# Patient Record
Sex: MALE | Race: WHITE | NOT HISPANIC OR LATINO | Employment: OTHER | URBAN - METROPOLITAN AREA
[De-identification: names, ages, dates, MRNs, and addresses within clinical notes are randomized per-mention and may not be internally consistent; named-entity substitution may affect disease eponyms.]

---

## 2021-04-22 ENCOUNTER — OFFICE VISIT (OUTPATIENT)
Dept: FAMILY MEDICINE CLINIC | Facility: CLINIC | Age: 41
End: 2021-04-22
Payer: COMMERCIAL

## 2021-04-22 VITALS
SYSTOLIC BLOOD PRESSURE: 128 MMHG | BODY MASS INDEX: 23.79 KG/M2 | OXYGEN SATURATION: 98 % | TEMPERATURE: 97.6 F | RESPIRATION RATE: 16 BRPM | HEART RATE: 88 BPM | HEIGHT: 70 IN | DIASTOLIC BLOOD PRESSURE: 74 MMHG | WEIGHT: 166.2 LBS

## 2021-04-22 DIAGNOSIS — Z00.00 HEALTHCARE MAINTENANCE: Primary | ICD-10-CM

## 2021-04-22 DIAGNOSIS — Z23 ENCOUNTER FOR IMMUNIZATION: ICD-10-CM

## 2021-04-22 PROCEDURE — 90471 IMMUNIZATION ADMIN: CPT

## 2021-04-22 PROCEDURE — 3725F SCREEN DEPRESSION PERFORMED: CPT | Performed by: FAMILY MEDICINE

## 2021-04-22 PROCEDURE — 3008F BODY MASS INDEX DOCD: CPT | Performed by: FAMILY MEDICINE

## 2021-04-22 PROCEDURE — 1036F TOBACCO NON-USER: CPT | Performed by: FAMILY MEDICINE

## 2021-04-22 PROCEDURE — 99385 PREV VISIT NEW AGE 18-39: CPT | Performed by: FAMILY MEDICINE

## 2021-04-22 PROCEDURE — 90715 TDAP VACCINE 7 YRS/> IM: CPT

## 2021-04-22 NOTE — PROGRESS NOTES
Assessment/Plan:    Healthcare maintenance  It was discussed about immunizations, diet, exercise and safety measures  Problem List Items Addressed This Visit        Other    Healthcare maintenance - Primary     It was discussed about immunizations, diet, exercise and safety measures  Relevant Orders    Comprehensive metabolic panel    Lipid Panel with Direct LDL reflex    Encounter for immunization    Relevant Orders    TDAP VACCINE GREATER THAN OR EQUAL TO 8YO IM (Completed)            Subjective:      Patient ID: Andrea Donnelly is a 47 y o  male  He presents today to establish care  He has PMH of Gilbert's syndrome  He reports that he is otherwise healthy, takes no medications, and has no complaints  He denies any tobacco or drug use  Drinks 1 beer monthly  No family history of early heart disease  Requests a TdaP, as he and his wife are expecting their first child in July  The following portions of the patient's history were reviewed and updated as appropriate: allergies, current medications, past family history, past medical history, past social history, past surgical history and problem list     Review of Systems   Constitutional: Negative for chills and fever  HENT: Negative for trouble swallowing  Eyes: Negative for visual disturbance  Respiratory: Negative for cough and shortness of breath  Cardiovascular: Negative for chest pain and palpitations  Gastrointestinal: Negative for abdominal pain, blood in stool and vomiting  Endocrine: Negative for cold intolerance and heat intolerance  Genitourinary: Negative for difficulty urinating and dysuria  Musculoskeletal: Negative for gait problem  Skin: Negative for rash  Neurological: Negative for dizziness, syncope and headaches  Hematological: Negative for adenopathy  Psychiatric/Behavioral: Negative for behavioral problems           Objective:      /74 (BP Location: Left arm, Patient Position: Sitting, Cuff Size: Standard)   Pulse 88   Temp 97 6 °F (36 4 °C) (Tympanic)   Resp 16   Ht 5' 10" (1 778 m)   Wt 75 4 kg (166 lb 3 2 oz)   SpO2 98%   BMI 23 85 kg/m²          Physical Exam  Vitals signs and nursing note reviewed  Constitutional:       Appearance: He is well-developed  HENT:      Head: Normocephalic and atraumatic  Eyes:      Pupils: Pupils are equal, round, and reactive to light  Neck:      Musculoskeletal: Normal range of motion and neck supple  Cardiovascular:      Rate and Rhythm: Normal rate and regular rhythm  Heart sounds: Normal heart sounds  Pulmonary:      Effort: Pulmonary effort is normal       Breath sounds: Normal breath sounds  Abdominal:      General: Bowel sounds are normal       Palpations: Abdomen is soft  Musculoskeletal: Normal range of motion  Lymphadenopathy:      Cervical: No cervical adenopathy  Skin:     General: Skin is warm  Findings: No rash  Neurological:      Mental Status: He is alert and oriented to person, place, and time  Cranial Nerves: No cranial nerve deficit

## 2021-04-23 ENCOUNTER — APPOINTMENT (OUTPATIENT)
Dept: LAB | Facility: IMAGING CENTER | Age: 41
End: 2021-04-23
Payer: COMMERCIAL

## 2021-04-23 DIAGNOSIS — Z00.00 HEALTHCARE MAINTENANCE: ICD-10-CM

## 2021-04-23 LAB
ALBUMIN SERPL BCP-MCNC: 4.1 G/DL (ref 3.5–5)
ALP SERPL-CCNC: 69 U/L (ref 46–116)
ALT SERPL W P-5'-P-CCNC: 43 U/L (ref 12–78)
ANION GAP SERPL CALCULATED.3IONS-SCNC: 5 MMOL/L (ref 4–13)
AST SERPL W P-5'-P-CCNC: 22 U/L (ref 5–45)
BILIRUB SERPL-MCNC: 1.34 MG/DL (ref 0.2–1)
BUN SERPL-MCNC: 10 MG/DL (ref 5–25)
CALCIUM SERPL-MCNC: 9.3 MG/DL (ref 8.3–10.1)
CHLORIDE SERPL-SCNC: 105 MMOL/L (ref 100–108)
CHOLEST SERPL-MCNC: 186 MG/DL (ref 50–200)
CO2 SERPL-SCNC: 29 MMOL/L (ref 21–32)
CREAT SERPL-MCNC: 0.84 MG/DL (ref 0.6–1.3)
GFR SERPL CREATININE-BSD FRML MDRD: 111 ML/MIN/1.73SQ M
GLUCOSE P FAST SERPL-MCNC: 89 MG/DL (ref 65–99)
HDLC SERPL-MCNC: 38 MG/DL
LDLC SERPL CALC-MCNC: 121 MG/DL (ref 0–100)
POTASSIUM SERPL-SCNC: 3.9 MMOL/L (ref 3.5–5.3)
PROT SERPL-MCNC: 7.4 G/DL (ref 6.4–8.2)
SODIUM SERPL-SCNC: 139 MMOL/L (ref 136–145)
TRIGL SERPL-MCNC: 136 MG/DL

## 2021-04-23 PROCEDURE — 80053 COMPREHEN METABOLIC PANEL: CPT

## 2021-04-23 PROCEDURE — 80061 LIPID PANEL: CPT

## 2021-04-23 PROCEDURE — 36415 COLL VENOUS BLD VENIPUNCTURE: CPT

## 2021-05-06 ENCOUNTER — TELEPHONE (OUTPATIENT)
Dept: FAMILY MEDICINE CLINIC | Facility: CLINIC | Age: 41
End: 2021-05-06

## 2021-05-06 NOTE — TELEPHONE ENCOUNTER
----- Message from [employer] sent at 5/6/2021 10:15 AM EDT -----  - Labs show slightly elevated cholesterol  I recommend low fat diet and regular exercise  - The rest of his labs are stable

## 2021-05-06 NOTE — TELEPHONE ENCOUNTER
Evangelista Orozco returned [employer], I gave him his bw results & message per Christopher leach   Pt understood & had no further questions

## 2022-04-29 ENCOUNTER — OFFICE VISIT (OUTPATIENT)
Dept: FAMILY MEDICINE CLINIC | Facility: CLINIC | Age: 42
End: 2022-04-29
Payer: COMMERCIAL

## 2022-04-29 VITALS
HEIGHT: 70 IN | OXYGEN SATURATION: 99 % | WEIGHT: 161.4 LBS | RESPIRATION RATE: 16 BRPM | HEART RATE: 76 BPM | SYSTOLIC BLOOD PRESSURE: 128 MMHG | TEMPERATURE: 97.6 F | BODY MASS INDEX: 23.11 KG/M2 | DIASTOLIC BLOOD PRESSURE: 78 MMHG

## 2022-04-29 DIAGNOSIS — E78.49 OTHER HYPERLIPIDEMIA: ICD-10-CM

## 2022-04-29 DIAGNOSIS — Z00.00 HEALTHCARE MAINTENANCE: Primary | ICD-10-CM

## 2022-04-29 DIAGNOSIS — G89.29 CHRONIC LEFT SHOULDER PAIN: ICD-10-CM

## 2022-04-29 DIAGNOSIS — M25.512 CHRONIC LEFT SHOULDER PAIN: ICD-10-CM

## 2022-04-29 PROCEDURE — 99395 PREV VISIT EST AGE 18-39: CPT | Performed by: FAMILY MEDICINE

## 2022-04-29 RX ORDER — DICLOFENAC SODIUM 75 MG/1
75 TABLET, DELAYED RELEASE ORAL 2 TIMES DAILY
Qty: 60 TABLET | Refills: 1 | Status: SHIPPED | OUTPATIENT
Start: 2022-04-29

## 2022-04-29 NOTE — PROGRESS NOTES
Assessment/Plan:  Healthcare maintenance  It was discussed about immunizations, diet, exercise and safety measures  Other hyperlipidemia  Not well controlled  Discussed about low-fat diet  I am going to repeat his fasting lipid profile  Chronic left shoulder pain  He was given prescription for diclofenac  Was told to avoid movement that triggered the pain  Apply ice  If symptoms are not improving call back and I will consider referral to see Ortho  Diagnoses and all orders for this visit:    Healthcare maintenance  -     CBC and differential; Future  -     Comprehensive metabolic panel; Future  -     TSH, 3rd generation with Free T4 reflex; Future  -     Lipid Panel with Direct LDL reflex; Future    Chronic left shoulder pain  -     diclofenac (VOLTAREN) 75 mg EC tablet; Take 1 tablet (75 mg total) by mouth 2 (two) times a day    Other hyperlipidemia  -     CBC and differential; Future  -     Comprehensive metabolic panel; Future  -     TSH, 3rd generation with Free T4 reflex; Future  -     Lipid Panel with Direct LDL reflex; Future        There are no Patient Instructions on file for this visit  Return in about 1 year (around 4/29/2023)  Subjective:      Patient ID: Deo Nichole is a 46 y o  male  Chief Complaint   Patient presents with    Physical Exam       Is here today for wellness exam and complained of left shoulder pain  Denies any history of injury or trauma  Stated it has been getting worse while he working out  The following portions of the patient's history were reviewed and updated as appropriate: allergies, current medications, past family history, past medical history, past social history, past surgical history and problem list     Review of Systems   Constitutional: Negative for chills and fever  HENT: Negative for trouble swallowing  Eyes: Negative for visual disturbance  Respiratory: Negative for cough and shortness of breath      Cardiovascular: Negative for chest pain and palpitations  Gastrointestinal: Negative for abdominal pain, blood in stool and vomiting  Endocrine: Negative for cold intolerance and heat intolerance  Genitourinary: Negative for difficulty urinating and dysuria  Musculoskeletal: Negative for gait problem  Left shoulder pain   Skin: Negative for rash  Neurological: Negative for dizziness, syncope and headaches  Hematological: Negative for adenopathy  Psychiatric/Behavioral: Negative for behavioral problems  Current Outpatient Medications   Medication Sig Dispense Refill    diclofenac (VOLTAREN) 75 mg EC tablet Take 1 tablet (75 mg total) by mouth 2 (two) times a day 60 tablet 1     No current facility-administered medications for this visit  Objective:    /78 (BP Location: Left arm, Patient Position: Sitting, Cuff Size: Standard)   Pulse 76   Temp 97 6 °F (36 4 °C) (Tympanic)   Resp 16   Ht 5' 10" (1 778 m)   Wt 73 2 kg (161 lb 6 4 oz)   SpO2 99%   BMI 23 16 kg/m²        Physical Exam  Vitals and nursing note reviewed  Constitutional:       Appearance: He is well-developed  HENT:      Head: Normocephalic and atraumatic  Eyes:      Pupils: Pupils are equal, round, and reactive to light  Cardiovascular:      Rate and Rhythm: Normal rate and regular rhythm  Heart sounds: Normal heart sounds  Pulmonary:      Effort: Pulmonary effort is normal       Breath sounds: Normal breath sounds  Abdominal:      General: Bowel sounds are normal       Palpations: Abdomen is soft  Musculoskeletal:         General: Tenderness (Tenderness to palpation the anterior aspect of left shoulder) present  Normal range of motion  Cervical back: Normal range of motion and neck supple  Lymphadenopathy:      Cervical: No cervical adenopathy  Skin:     General: Skin is warm  Findings: No rash  Neurological:      Mental Status: He is alert and oriented to person, place, and time        Cranial Nerves: No cranial nerve deficit                  Rachael Salazar MD

## 2022-04-29 NOTE — ASSESSMENT & PLAN NOTE
He was given prescription for diclofenac  Was told to avoid movement that triggered the pain  Apply ice  If symptoms are not improving call back and I will consider referral to see Ortho

## 2022-10-12 PROBLEM — Z00.00 HEALTHCARE MAINTENANCE: Status: RESOLVED | Noted: 2021-04-22 | Resolved: 2022-10-12

## 2022-11-04 ENCOUNTER — APPOINTMENT (OUTPATIENT)
Dept: RADIOLOGY | Facility: OTHER | Age: 42
End: 2022-11-04

## 2022-11-04 VITALS
HEART RATE: 74 BPM | DIASTOLIC BLOOD PRESSURE: 79 MMHG | SYSTOLIC BLOOD PRESSURE: 120 MMHG | HEIGHT: 70 IN | WEIGHT: 165 LBS | BODY MASS INDEX: 23.62 KG/M2

## 2022-11-04 DIAGNOSIS — G89.29 CHRONIC LEFT SHOULDER PAIN: ICD-10-CM

## 2022-11-04 DIAGNOSIS — M25.511 ACUTE PAIN OF RIGHT SHOULDER: ICD-10-CM

## 2022-11-04 DIAGNOSIS — M75.42 IMPINGEMENT SYNDROME OF LEFT SHOULDER: ICD-10-CM

## 2022-11-04 DIAGNOSIS — G89.29 CHRONIC RIGHT SHOULDER PAIN: ICD-10-CM

## 2022-11-04 DIAGNOSIS — M25.512 CHRONIC LEFT SHOULDER PAIN: ICD-10-CM

## 2022-11-04 DIAGNOSIS — G89.29 CHRONIC LEFT SHOULDER PAIN: Primary | ICD-10-CM

## 2022-11-04 DIAGNOSIS — M75.41 IMPINGEMENT SYNDROME OF RIGHT SHOULDER: ICD-10-CM

## 2022-11-04 DIAGNOSIS — M25.512 CHRONIC LEFT SHOULDER PAIN: Primary | ICD-10-CM

## 2022-11-04 DIAGNOSIS — M25.511 CHRONIC RIGHT SHOULDER PAIN: ICD-10-CM

## 2022-11-04 RX ORDER — TRIAMCINOLONE ACETONIDE 40 MG/ML
40 INJECTION, SUSPENSION INTRA-ARTICULAR; INTRAMUSCULAR
Status: COMPLETED | OUTPATIENT
Start: 2022-11-04 | End: 2022-11-04

## 2022-11-04 RX ADMIN — TRIAMCINOLONE ACETONIDE 40 MG: 40 INJECTION, SUSPENSION INTRA-ARTICULAR; INTRAMUSCULAR at 17:44

## 2022-11-04 NOTE — PATIENT INSTRUCTIONS
Bilateral shoulder impingement  Left shoulder steroid injection today  Start formal PT and home exercise program for both shoulders  Reduce workouts to every other day  Follow up with our office as needed

## 2022-11-04 NOTE — PROGRESS NOTES
1  Chronic left shoulder pain  XR shoulder 2+ vw left    Large joint arthrocentesis: R subacromial bursa    Ambulatory referral to Physical Therapy   2  Chronic right shoulder pain  Ambulatory referral to Physical Therapy   3  Impingement syndrome of left shoulder  Large joint arthrocentesis: R subacromial bursa    Ambulatory referral to Physical Therapy   4  Impingement syndrome of right shoulder  Ambulatory referral to Physical Therapy     Orders Placed This Encounter   Procedures   • Large joint arthrocentesis: R subacromial bursa   • XR shoulder 2+ vw left   • Ambulatory referral to Physical Therapy        Imaging Studies (I personally reviewed images in PACS and report):  Left shoulder x-ray 11/04/2022: No acute osseous abnormality  Right shoulder x-ray 11/04/2022: No acute osseous abnormality    IMPRESSION:  Bilateral shoulder impingement, left worse than right      Repeat X-ray next visit: None    Return if symptoms worsen or fail to improve  Patient Instructions   Bilateral shoulder impingement  Left shoulder steroid injection today  Start formal PT and home exercise program for both shoulders  Reduce workouts to every other day  Follow up with our office as needed        CHIEF COMPLAINT:  Bilateral shoulder pain    HPI:  Hernandez Gamble is a 38 y o  male  who presents as a new patient for evaluation of chronic left shoulder pain  Per chart review, this was first mentioned to his PCP Dr Claudia Katz in April of this year, for which she was referred to our office  Patient is right-hand dominant  Visit 11/4/2022 : Today, patient reports bilateral shoulder pain, left worse than right  For his left shoulder, he has had discomfort for over 2 years  Describes discomfort as dull and intermittent as well as of mild-to-moderate severity  Pain is not constant but present when reaching out/for or up above his head  Localized to his greater tuberosity  States his pain interferes with his total range of motion  Denies injury or trauma history but states he was trying to do more at home workouts around the onset of pain in 2020  He had some palliation of his discomfort with stretching and a course of oral Voltaren  Denies weakness, paresthesias, or neck pain  Has not tried formal physical therapy in the past   States he continues to try to work out with his upper extremities roughly 5 times per week  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  Regarding his right shoulder discomfort, he states this started a few weeks ago after a day of heavy lifting involving caring drywall down into his basement  He qualifies this discomfort as a mild ache noted particularly when yawning  States he has full range of motion with his right shoulder  Localizes his discomfort to the upper lateral arm and the superior aspect of his shoulder  He has been using heating pad with good effect  Also denies injury or trauma history to the shoulder  Review of Systems      Following history reviewed and update:    Past Medical History:   Diagnosis Date   • Gilbert's syndrome      History reviewed  No pertinent surgical history    Social History   Social History     Substance and Sexual Activity   Alcohol Use Yes     Social History     Substance and Sexual Activity   Drug Use Not on file     Social History     Tobacco Use   Smoking Status Never Smoker   Smokeless Tobacco Never Used     Family History   Problem Relation Age of Onset   • Colon cancer Father    • Diabetes Maternal Grandmother    • Arthritis Maternal Grandfather    • Diabetes Paternal Grandfather    • Dementia Paternal Grandfather      No Known Allergies       Physical Exam  /79 (BP Location: Right arm, Patient Position: Sitting, Cuff Size: Adult)   Pulse 74   Ht 5' 10" (1 778 m)   Wt 74 8 kg (165 lb)   BMI 23 68 kg/m²     Constitutional:  see vital signs  Gen: well-developed, normocephalic/atraumatic, well-groomed  Eyes: No inflammation or discharge of conjunctiva or lids; sclera clear   Pharynx: no inflammation, lesion, or mass of lips  Neck: supple, no masses, non-distended  MSK: no inflammation, lesion, mass, or clubbing of nails and digits except for other than mentioned below  SKIN: no visible rashes or skin lesions  Pulmonary/Chest: Effort normal  No respiratory distress     NEURO: cranial nerves grossly intact  PSYCH:  Alert and oriented to person, place, and time; recent and remote memory intact; mood normal, no depression, anxiety, or agitation, judgment and insight good and intact     Ortho Exam  LEFT SHOULDER:  Erythema: no  Swelling: no  Increased Warmth: no    Tenderness: + anterolateral subacromial    ROM  Touchdown sign: abnormal, abducts to 160 degrees with pain  External Rotation: 60  Internal Rotation: L1    Strength  Abduction: 5/5  ER: 5/5  IR: 5/5    Drop-Arm: negative  Emptycan: negative  Belly Press: -  Lift-off Test:    Bill: +  Neer: +  Cross-Arm: -  Speeds: -    Internal Impingement:  (crank position pain)    Labral Crank Test :  (abducted 90, axial load, guided IR & Er)    Modified Labral Shift:  (seated, ER, abduction, axial load, guided abd/add)    Winchester's Test: -  (FF 90, abd 15, resist thumbs up-, resist thumbs down+)    Apprehension: -  Ashish's Relocation Maneuver:    RIGHT SHOULDER:  Erythema: no  Swelling: no  Increased Warmth: no    Tenderness:     ROM  Touchdown sign: intact  External Rotation: Intact  Internal Rotation: Intact    Strength  Abduction: 5/5  ER: 5/5  IR: 5/5    Drop-Arm: negative  Emptycan: negative  Belly Press:   Lift-off Test:    Khan: negative  Neer: negative  Cross-Arm:   Speeds: -    Internal Impingement:  (crank position pain)    Labral Crank Test :  (abducted 90, axial load, guided IR & Er)    Modified Labral Shift:  (seated, ER, abduction, axial load, guided abd/add)    Winchester's Test: -  (FF 90, abd 15, resist thumbs up-, resist thumbs down+)    Apprehension:-  Ashish's Relocation Maneuver:      Large joint arthrocentesis: L subacromial bursa  Universal Protocol:  Consent: Verbal consent obtained  Risks and benefits: risks, benefits and alternatives were discussed  Consent given by: patient  Patient understanding: patient states understanding of the procedure being performed  Site marked: the operative site was marked  Radiology Images displayed and confirmed   If images not available, report reviewed: imaging studies available  Required items: required blood products, implants, devices, and special equipment available  Patient identity confirmed: verbally with patient    Supporting Documentation  Indications: pain   Procedure Details  Location: shoulder - L subacromial bursa  Preparation: Patient was prepped and draped in the usual sterile fashion  Needle size: 22 G  Ultrasound guidance: no  Approach: posterolateral  Medications administered: 40 mg triamcinolone acetonide 40 mg/mL    Patient tolerance: patient tolerated the procedure well with no immediate complications  Dressing:  Sterile dressing applied    Naropin (Ropivacaine) 0 5%  NDC: 88113-448-70  Lot#: [id number]  Exp: 01/2026  4mL

## 2022-11-08 ENCOUNTER — EVALUATION (OUTPATIENT)
Dept: PHYSICAL THERAPY | Facility: REHABILITATION | Age: 42
End: 2022-11-08

## 2022-11-08 DIAGNOSIS — M75.41 IMPINGEMENT SYNDROME OF RIGHT SHOULDER: ICD-10-CM

## 2022-11-08 DIAGNOSIS — G89.29 CHRONIC LEFT SHOULDER PAIN: ICD-10-CM

## 2022-11-08 DIAGNOSIS — G89.29 CHRONIC RIGHT SHOULDER PAIN: ICD-10-CM

## 2022-11-08 DIAGNOSIS — M75.42 IMPINGEMENT SYNDROME OF LEFT SHOULDER: ICD-10-CM

## 2022-11-08 DIAGNOSIS — M25.511 CHRONIC RIGHT SHOULDER PAIN: ICD-10-CM

## 2022-11-08 DIAGNOSIS — M25.512 CHRONIC LEFT SHOULDER PAIN: ICD-10-CM

## 2022-11-08 NOTE — PROGRESS NOTES
PT Evaluation     Today's date: 2022  Patient name: Juan Piña  : 1980  MRN: [id number]  Referring provider: Sinai Hunter DO  Dx:   Encounter Diagnosis     ICD-10-CM    1  Chronic left shoulder pain  M25 512 Ambulatory referral to Physical Therapy    G89 29    2  Chronic right shoulder pain  M25 511 Ambulatory referral to Physical Therapy    G89 29    3  Impingement syndrome of left shoulder  M75 42 Ambulatory referral to Physical Therapy   4  Impingement syndrome of right shoulder  M75 41 Ambulatory referral to Physical Therapy       Start Time: 1115  Stop Time: 1200  Total time in clinic (min): 45 minutes    Assessment  Assessment details: Pt is a 37 yo male with chronic left shoulder pain that began 2 years ago and and right shoulder pain that began after hanging drywall a couple of months ago  His left shoulder pain has significant reduced since injection but he continues to have end range pain and IR is limited greater than ER, flex and abd  His right shoulder is grossly WNL but there is a painful arc  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  Cervical ROM is painful on the right and limited with pain radiating into the right UT  He would benefit from therapeutic exercise to improve posture, ROM and function and manual therapy to improve capsular mobility and cervical mobility      Impairments: abnormal or restricted ROM, impaired physical strength, lacks appropriate home exercise program, pain with function and poor posture     Symptom irritability: moderateUnderstanding of Dx/Px/POC: excellent  Goals  STG: in 4 week  ROM WNL  Performing HEP consistently  LTG: by discharge  Able to reach out to the side without pain  Able to reach back without pain  Able to do heavy household work without increased pain    Plan  Patient would benefit from: skilled physical therapy  Referral necessary: No  Planned modality interventions: thermotherapy: hydrocollator packs  Planned therapy interventions: joint mobilization, manual therapy, neuromuscular re-education, patient education, strengthening, stretching, therapeutic exercise and home exercise program  Frequency: 2x week  Duration in weeks: 10  Treatment plan discussed with: patient        Subjective Evaluation    History of Present Illness  Mechanism of injury: Today, patient reports bilateral shoulder pain, left worse than right  For his left shoulder, he has had discomfort for over 2 years  Describes discomfort as dull and intermittent as well as of mild-to-moderate severity  Pain is not constant but present when reaching out/for or up above his head  Localized to his greater tuberosity  States his pain interferes with his total range of motion  Denies injury or trauma history but states he was trying to do more at home workouts around the onset of pain in   He received a steroid injection on the left on  and it is feeling much better since then  The right one started after carrying drywall  2-3 weeks ago and it is worsening  Yawning increased this pain  Still difficult to raise the left arm in abduction and flexion  Pain  Current pain ratin  At best pain ratin  At worst pain ratin  Location: Right posterior lateral pain, Left lateral pain  Quality: sharp    Life stress: desk work,  exercising, yard work, care for todler    Patient Goals  Patient goals for therapy: decreased pain  Patient goal: Carry toddler,  weight lifting        Objective     Postural Observations  Seated posture: fair  Standing posture: fair        Palpation   Left   No palpable tenderness to the biceps, infraspinatus, levator scapulae, middle trapezius, rhomboids, subscapularis and teres major  Tenderness of the supraspinatus  Right   No palpable tenderness to the infraspinatus, levator scapulae, middle trapezius, rhomboids and subscapularis  Muscle spasm in the upper trapezius     Tenderness of the biceps, supraspinatus and teres major  Tenderness     Left Shoulder   Tenderness in the [facility] joint  Right Shoulder  Tenderness in the bicipital groove, infraspinatus tendon, subacromial bursa and supraspinatus tendon  Cervical/Thoracic Screen   Cervical range of motion within normal limits with the following exceptions: Limited 25% in SB w/right sided pain in both directions  Right sided pain with right rotation and extension    Active Range of Motion   Left Shoulder   Flexion: 140 degrees with pain  Abduction: 140 degrees with pain  External rotation BTH: T2 with pain  Internal rotation BTB: L5 with pain    Right Shoulder   Flexion: 165 degrees with pain  Abduction: 165 degrees with pain  External rotation BTH: T3   Internal rotation BTB: T5     Passive Range of Motion   Left Shoulder   Flexion: 160 degrees with pain  Abduction: 165 degrees with pain  External rotation 90°: 45 degrees with pain  Internal rotation 90°: 40 degrees with pain    Right Shoulder   Flexion: WFL and with pain  Abduction: WFL and with pain  External rotation 90°: WFL and with pain  Internal rotation 90°: [facility]    Joint Play   Left Shoulder  Joints within functional limits are the anterior capsule and inferior capsule  Hypomobile in the posterior capsule and AC joint  Right Shoulder  Joints within functional limits are the anterior capsule, posterior capsule and AC joint  Hypomobile in the inferior capsule  Comments  Left posterior capsule comments: painful  Right posterior capsule comments: painful       Additional Joint Play Details  Hypomobile at CT junction, and T2,3      Strength/Myotome Testing     Left Shoulder     Planes of Motion   Flexion: 4   Abduction: 5   External rotation at 0°: 5   Internal rotation at 0°: 5     Isolated Muscles   Biceps: 5   Triceps: 5     Right Shoulder     Planes of Motion   Flexion: 5   Abduction: 5   External rotation at 0°: 5   Internal rotation at 0°: 5     Isolated Muscles   Biceps: 5   Triceps: 5     Tests     Left Shoulder   Positive AC shear, Hawkin's and Neer's  Right Shoulder   Negative Hawkin's and Neer's  Flowsheet Rows    Flowsheet Row Most Recent Value   PT/OT G-Codes    Current Score 62   Projected Score 75             Precautions: none     Daily Treatment Diary      Assessment  11/8                     Eval/Reval                       FOTO         **         **   Manuals    GH mob NT                                               Exercise Diary                             cerv retr x10                      cerv retr and ext x10                      scap retr x10                      supine shoulder wand flex x10                      towel IR stretch 10"x5                      pec stretch                        TB B ER                        TB rows                                                                                                                                                                                               Modalities    ice PRN                                             Access Code: [email or web address]  URL: [email or web address]  Date: 11/08/2022  Prepared by: Lauren Caro    Exercises  · Seated Cervical Retraction - 5 x daily - 7 x weekly - 1 sets - 10 reps - 2 sec hold  · Seated Cervical Retraction and Extension - 5 x daily - 7 x weekly - 1 sets - 10 reps  · Standing Scapular Retraction - 2 x daily - 7 x weekly - 1 sets - 10 reps - 5 sec hold  · Supine Shoulder Flexion with Dowel - 2 x daily - 7 x weekly - 1 sets - 10 reps  · Standing Shoulder Internal Rotation Stretch with Towel - 2 x daily - 7 x weekly - 1 sets - 10 reps - 5 sec hold

## 2022-11-15 ENCOUNTER — OFFICE VISIT (OUTPATIENT)
Dept: PHYSICAL THERAPY | Facility: REHABILITATION | Age: 42
End: 2022-11-15

## 2022-11-15 DIAGNOSIS — G89.29 CHRONIC LEFT SHOULDER PAIN: Primary | ICD-10-CM

## 2022-11-15 DIAGNOSIS — M75.41 IMPINGEMENT SYNDROME OF RIGHT SHOULDER: ICD-10-CM

## 2022-11-15 DIAGNOSIS — M75.42 IMPINGEMENT SYNDROME OF LEFT SHOULDER: ICD-10-CM

## 2022-11-15 DIAGNOSIS — M25.511 CHRONIC RIGHT SHOULDER PAIN: ICD-10-CM

## 2022-11-15 DIAGNOSIS — G89.29 CHRONIC RIGHT SHOULDER PAIN: ICD-10-CM

## 2022-11-15 DIAGNOSIS — M25.512 CHRONIC LEFT SHOULDER PAIN: Primary | ICD-10-CM

## 2022-11-15 NOTE — PROGRESS NOTES
Daily Note     Today's date: 11/15/2022  Patient name: Doreen Johnson  : 1980  MRN: [id number]  Referring provider: Sayda Calix DO  Dx:   Encounter Diagnosis     ICD-10-CM    1  Chronic left shoulder pain  M25 512     G89 29    2  Chronic right shoulder pain  M25 511     G89 29    3  Impingement syndrome of left shoulder  M75 42    4  Impingement syndrome of right shoulder  M75 41                   Subjective: Pt is feeling much better compared to last session  He states the interventions are going well and believes they are helping and noticed a great improvement in his LUE shoulder elevation ROM  Objective: See treatment diary below      Assessment: Pt showed good tolerance to all interventions today except noting increased difficulty with pec stretches especially on the LUE  He demonstrated full but painful end range motion for flexion  He was educated to continue with current HEP and will progress as tolerated at next visit  Plan: Continue per plan of care        Precautions: none     Daily Treatment Diary      Assessment  11/8  11/15                   Eval/Reval                       FOTO         **         **   Manuals     mob NT  [address]                                             Exercise Diary                             cerv retr x10  10x                     cerv retr and ext x10  10x                     scap retr x10  10x5"                     supine shoulder wand flex x10                      towel IR stretch 10"x5                      pec stretch    20"x3                     TB B ER    BTB 10x5"                    TB rows/ext    10x3 BTB                    UBE   BW 6'                                                                                                                                                                    Modalities    ice PRN

## 2022-11-17 ENCOUNTER — OFFICE VISIT (OUTPATIENT)
Dept: PHYSICAL THERAPY | Facility: REHABILITATION | Age: 42
End: 2022-11-17

## 2022-11-17 DIAGNOSIS — M25.512 CHRONIC LEFT SHOULDER PAIN: Primary | ICD-10-CM

## 2022-11-17 DIAGNOSIS — M25.511 CHRONIC RIGHT SHOULDER PAIN: ICD-10-CM

## 2022-11-17 DIAGNOSIS — M75.42 IMPINGEMENT SYNDROME OF LEFT SHOULDER: ICD-10-CM

## 2022-11-17 DIAGNOSIS — G89.29 CHRONIC LEFT SHOULDER PAIN: Primary | ICD-10-CM

## 2022-11-17 DIAGNOSIS — G89.29 CHRONIC RIGHT SHOULDER PAIN: ICD-10-CM

## 2022-11-17 DIAGNOSIS — M75.41 IMPINGEMENT SYNDROME OF RIGHT SHOULDER: ICD-10-CM

## 2022-11-17 NOTE — PROGRESS NOTES
Daily Note     Today's date: 2022  Patient name: Carmen Clancy  : 1980  MRN: [id number]  Referring provider: Jody Shearer DO  Dx:   Encounter Diagnosis     ICD-10-CM    1  Chronic left shoulder pain  M25 512     G89 29       2  Chronic right shoulder pain  M25 511     G89 29       3  Impingement syndrome of left shoulder  M75 42       4  Impingement syndrome of right shoulder  M75 41                      Subjective: Patient reports seeing improvements in shoulder discomfort since starting therapy  Objective: See treatment diary below      Assessment: Patient continues to tolerate treatment well  Good form and understanding of exercises performed  Updated HEP provided to patient today  Left shoulder PROM improving but still limited, codey IR  No increased pain reported post treatment  Pt will benefit from continued skilled PT intervention in order to address his remaining limitations and to restore maximal function  Plan: Continue per plan of care        Precautions: none     Daily Treatment Diary      Assessment  11/8  11/15  11/17                 Eval/Reval                       FOTO         **         **   Manuals     mob NT  [address]  Glides JG                    Gentle L sh PROM JG                       Exercise Diary                             cerv retr x10  10x   5"x15                  cerv retr and ext x10  10x   10x                  scap retr x10  10x5"   5"x20                  supine shoulder wand flex x10    x10                  towel IR stretch 10"x5    10"x5                  pec stretch    20"x3   15"x5                  TB B ER    BTB 10x5"  BTB 5"x15                  TB rows/ext    10x3 BTB  Black 2x10                  UBE   BW 6'   retro 6' L2                                                IR with wand x10                                                                                                                 Modalities    ice PRN

## 2022-11-22 ENCOUNTER — OFFICE VISIT (OUTPATIENT)
Dept: PHYSICAL THERAPY | Facility: REHABILITATION | Age: 42
End: 2022-11-22

## 2022-11-22 DIAGNOSIS — G89.29 CHRONIC LEFT SHOULDER PAIN: Primary | ICD-10-CM

## 2022-11-22 DIAGNOSIS — M25.512 CHRONIC LEFT SHOULDER PAIN: Primary | ICD-10-CM

## 2022-11-22 DIAGNOSIS — M25.511 CHRONIC RIGHT SHOULDER PAIN: ICD-10-CM

## 2022-11-22 DIAGNOSIS — M75.42 IMPINGEMENT SYNDROME OF LEFT SHOULDER: ICD-10-CM

## 2022-11-22 DIAGNOSIS — G89.29 CHRONIC RIGHT SHOULDER PAIN: ICD-10-CM

## 2022-11-22 DIAGNOSIS — M75.41 IMPINGEMENT SYNDROME OF RIGHT SHOULDER: ICD-10-CM

## 2022-11-22 NOTE — PROGRESS NOTES
Daily Note     Today's date: 2022  Patient name: Nancy Forrest  : 1980  MRN: [id number]  Referring provider: Farooq Christian DO  Dx:   Encounter Diagnosis     ICD-10-CM    1  Chronic left shoulder pain  M25 512     G89 29       2  Chronic right shoulder pain  M25 511     G89 29       3  Impingement syndrome of left shoulder  M75 42       4  Impingement syndrome of right shoulder  M75 41                      Subjective: pt reports no longer experiences pain at rest and less pain/difficulty with reaching into cabinets  Objective: See treatment diary below      Assessment: Tolerated treatment well  Increased mobility/ROM post manuals with less pain/discomfort at end range  Good response with exercises with no adverse reactions  Patient demonstrated fatigue post treatment, exhibited good technique with therapeutic exercises and would benefit from continued PT      Plan: Continue per plan of care  Progress treatment as tolerated         Precautions: none     Daily Treatment Diary      Assessment  11/8  11/15  11/17  11/22               Eval/Reval                       FOTO         **         **   Manuals     mob NT  [address]  [place] JG  TE brigid post/inf & AC L shldr                  Gentle L sh PROM JG L shldr  TE                      Exercise Diary                             cerv retr x10  10x   5"x15  5"x15                cerv retr and ext x10  10x   10x  x10                scap retr x10  10x5"   5"x20  5"x20                supine shoulder wand flex x10    x10  5"x10                towel IR stretch 10"x5    10"x5                  pec stretch    20"x3   15"x5  30"x3                TB B ER    BTB 10x5"  BTB 5"x15  BTB 5"x20                TB rows/ext    10x3 BTB  Black 2x10  black 5"x20                UBE   BW 6'   retro 6' L2  retro 6' L2                                              IR with wand x10 Modalities    ice PRN

## 2022-11-28 ENCOUNTER — OFFICE VISIT (OUTPATIENT)
Dept: PHYSICAL THERAPY | Facility: REHABILITATION | Age: 42
End: 2022-11-28

## 2022-11-28 DIAGNOSIS — G89.29 CHRONIC RIGHT SHOULDER PAIN: ICD-10-CM

## 2022-11-28 DIAGNOSIS — M25.512 CHRONIC LEFT SHOULDER PAIN: Primary | ICD-10-CM

## 2022-11-28 DIAGNOSIS — M75.42 IMPINGEMENT SYNDROME OF LEFT SHOULDER: ICD-10-CM

## 2022-11-28 DIAGNOSIS — M75.41 IMPINGEMENT SYNDROME OF RIGHT SHOULDER: ICD-10-CM

## 2022-11-28 DIAGNOSIS — G89.29 CHRONIC LEFT SHOULDER PAIN: Primary | ICD-10-CM

## 2022-11-28 DIAGNOSIS — M25.511 CHRONIC RIGHT SHOULDER PAIN: ICD-10-CM

## 2022-11-28 NOTE — PROGRESS NOTES
Daily Note     Today's date: 2022   Patient name: Rafiq Alonzo  : 1980  MRN: [id number]  Referring provider: Morales Shelby DO  Dx:   Encounter Diagnosis     ICD-10-CM    1  Chronic left shoulder pain  M25 512     G89 29       2  Chronic right shoulder pain  M25 511     G89 29       3  Impingement syndrome of left shoulder  M75 42       4  Impingement syndrome of right shoulder  M75 41                      Subjective: Pt states that his left shoulder continues to feel good but the right is still painful with yawning and reaching up and back  Objective: See treatment diary below      Assessment: Tolerated treatment well  Pt has end range pain with left shoulder abd and flexion and a painful arc in abduction  This reduced with manual therapy  It did not full resolve with MWM but did after [facility] mobilization  Notable UT activation with shoulder activity  This reduced with motion performed in side lying  Pt will need reeducation to restore normal biomechanics of the shoulder    Patient demonstrated fatigue post treatment, exhibited good technique with therapeutic exercises and would benefit from continued PT      Plan: Continue per plan of care  Progress treatment as tolerated         Precautions: none     Daily Treatment Diary      Assessment  11/8  11/15  11/17  11/22  11/28             Eval/Reval                       FOTO         **  do Foto       **   Manuals    GH mob NT  [address]  Glides JG  TE glides post/inf & AC L shldr  NT gr IV inf and post                Gentle L sh PROM JG L shldr  TE NT        recip stab     60"x2        MWM GH w/scaption     NT        Exercise Diary                             cerv retr x10  10x   5"x15  5"x15  5"x10              cerv retr and ext x10  10x   10x  x10  x10              scap retr x10  10x5"   5"x20  5"x20                supine shoulder wand flex x10    x10  5"x10  5"x10              towel IR stretch 10"x5    10"x5    20"x3              pec stretch    20"x3 15"x5  30"x3  30"x3              TB B ER    BTB 10x5"  BTB 5"x15  BTB 5"x20 Blue loop 5" 2x10              TB rows/ext    10x3 BTB  Black 2x10  black 5"x20  20# rows 2x10, 15# 2x10              UBE   BW 6'   retro 6' L2  retro 6' L2  3'fwd, 3'back                                            IR with wand x10                  SL flex & abd         2x10              supine alphabet         1x                                                             Modalities    ice PRN

## 2022-11-30 ENCOUNTER — OFFICE VISIT (OUTPATIENT)
Dept: PHYSICAL THERAPY | Facility: REHABILITATION | Age: 42
End: 2022-11-30

## 2022-11-30 DIAGNOSIS — G89.29 CHRONIC RIGHT SHOULDER PAIN: ICD-10-CM

## 2022-11-30 DIAGNOSIS — M75.41 IMPINGEMENT SYNDROME OF RIGHT SHOULDER: ICD-10-CM

## 2022-11-30 DIAGNOSIS — M75.42 IMPINGEMENT SYNDROME OF LEFT SHOULDER: ICD-10-CM

## 2022-11-30 DIAGNOSIS — M25.512 CHRONIC LEFT SHOULDER PAIN: Primary | ICD-10-CM

## 2022-11-30 DIAGNOSIS — G89.29 CHRONIC LEFT SHOULDER PAIN: Primary | ICD-10-CM

## 2022-11-30 DIAGNOSIS — M25.511 CHRONIC RIGHT SHOULDER PAIN: ICD-10-CM

## 2022-11-30 NOTE — PROGRESS NOTES
Daily Note     Today's date: 2022   Patient name: Deo Nicohle  : 1980  MRN: [id number]  Referring provider: Colton Reyez DO  Dx:   Encounter Diagnosis     ICD-10-CM    1  Chronic left shoulder pain  M25 512     G89 29       2  Chronic right shoulder pain  M25 511     G89 29       3  Impingement syndrome of left shoulder  M75 42       4  Impingement syndrome of right shoulder  M75 41                      Subjective: Pt reports he feels gradual improvement in both shoulders, however, continues with left shoulder pain with reaching up/behind with his left hand  Objective: See treatment diary below  Assessment: Pt with good response to manual techniques added today, noting improved mobility and decreased pain with completion  He required moderate verbal cues for proper form and position with all exercises added today  Will continue to progress program as able  Pt will benefit from continued skilled PT intervention in order to address his remaining limitations and to restore maximal function  Plan: Continue per plan of care  Progress treatment as tolerated         Precautions: none     Daily Treatment Diary      Assessment  11/8  11/15  11/17  11/22  11/28  11/30           Eval/Reval                       FOTO         **  do Foto       **   Manuals    GH mob NT  [address]  Glides JG  TE glides post/inf & AC L shldr  NT gr IV inf and post  B Shoulders  MD              Gentle L sh PROM JG L shldr  TE NT        B Shoulder PROM      MD       recip stab     60"x2        MWM GH w/scaption     NT        Exercise Diary     Upper Thoracic Extension Stretch            NV           Supine 1/2 Foam Pec Stretch       NV       1/2 Foam Roller Series       NV        cerv retr x10  10x   5"x15  5"x15  5"x10              cerv retr and ext x10  10x   10x  x10  x10              scap retr x10  10x5"   5"x20  5"x20                supine shoulder wand flex x10    x10  5"x10  5"x10              towel IR stretch 10"x5 10"x5    20"x3  Strap   30"x3 ea    HEP          Pec/Biceps Wall Stretch      30"x3 ea  HEP       pec stretch    20"x3   15"x5  30"x3  30"x3  Corner   30"x3  HEP          TB B ER    BTB 10x5"  BTB 5"x15  BTB 5"x20 Blue loop 5" 2x10              TB rows/ext    10x3 BTB  Black 2x10  black 5"x20  20# rows 2x10, 15# 2x10              UBE   BW 6'   retro 6' L2  retro 6' L2  3'fwd, 3'back  L3  3'/3'                                          IR with wand x10                  SL flex & abd         2x10              supine alphabet         1x              TB Rows            Green   5"  1x10            TB H ABD      Orange  5"  1x10        TB Ext            Green   5"  1x10           Modalities    ice PRN

## 2022-12-05 ENCOUNTER — OFFICE VISIT (OUTPATIENT)
Dept: PHYSICAL THERAPY | Facility: REHABILITATION | Age: 42
End: 2022-12-05

## 2022-12-05 DIAGNOSIS — M75.42 IMPINGEMENT SYNDROME OF LEFT SHOULDER: ICD-10-CM

## 2022-12-05 DIAGNOSIS — M75.41 IMPINGEMENT SYNDROME OF RIGHT SHOULDER: ICD-10-CM

## 2022-12-05 DIAGNOSIS — G89.29 CHRONIC LEFT SHOULDER PAIN: Primary | ICD-10-CM

## 2022-12-05 DIAGNOSIS — M25.511 CHRONIC RIGHT SHOULDER PAIN: ICD-10-CM

## 2022-12-05 DIAGNOSIS — G89.29 CHRONIC RIGHT SHOULDER PAIN: ICD-10-CM

## 2022-12-05 DIAGNOSIS — M25.512 CHRONIC LEFT SHOULDER PAIN: Primary | ICD-10-CM

## 2022-12-05 NOTE — PROGRESS NOTES
Daily Note     Today's date: 2022  Patient name: Silas Martinez  : 1980  MRN: [id number]  Referring provider: Robinson Valdez DO  Dx:   Encounter Diagnosis     ICD-10-CM    1  Chronic left shoulder pain  M25 512     G89 29       2  Chronic right shoulder pain  M25 511     G89 29       3  Impingement syndrome of left shoulder  M75 42       4  Impingement syndrome of right shoulder  M75 41                      Subjective: pt reports hos shoulder have been feeling better, R shoulder feels better than the R       Objective: See treatment diary below      Assessment: Tolerated treatment well  Good response with PROM with mild end range tightness with appropriate stretching response and denied pain while doing so  Good tolerance with progressions and manuals  Patient demonstrated fatigue post treatment, exhibited good technique with therapeutic exercises and would benefit from continued PT      Plan: Continue per plan of care  Progress treatment as tolerated         Precautions: none     Daily Treatment Diary      Assessment  11/8  11/15  11/17  11/22  11/28  11/30  12/5         Eval/Reval                       FOTO         **  do Foto       **   Manuals    GH mob NT  [address]  Glides JG  TE glides post/inf & AC L shldr  NT gr IV inf and post  B Shoulders  MD Escobar L sh PROM JG L shldr  TE NT        B Shoulder PROM      MD MEJIA      recip stab     60"x2        MWM GH w/scaption     NT        Exercise Diary     Upper Thoracic Extension Stretch            NV  10"x5         Supine 1/2 Foam Pec Stretch       NV 1'x2      1/2 Foam Roller Series       NV 1' ea       cerv retr x10  10x   5"x15  5"x15  5"x10              cerv retr and ext x10  10x   10x  x10  x10              scap retr x10  10x5"   5"x20  5"x20                supine shoulder wand flex x10    x10  5"x10  5"x10              towel IR stretch 10"x5    10"x5    20"x3  Strap   30"x3 ea    HEP          Pec/Biceps Wall Stretch      30"x3 ea  HEP pec stretch    20"x3   15"x5  30"x3  30"x3  Corner   30"x3  HEP          TB B ER    BTB 10x5"  BTB 5"x15  BTB 5"x20 Blue loop 5" 2x10              TB rows/ext    10x3 BTB  Black 2x10  black 5"x20  20# rows 2x10, 15# 2x10              UBE   BW 6'   retro 6' L2  retro 6' L2  3'fwd, 3'back  L3  3'/3'  L3  3'/3'                                        IR with wand x10                  SL flex & abd         2x10              supine alphabet         1x              TB Rows            Green   5"  1x10  green 5"2x10          TB H ABD      Orange  5"  1x10 green 5"2x10       TB Ext            Green   5"  1x10  green 5"2x10         Modalities    ice PRN

## 2022-12-07 ENCOUNTER — EVALUATION (OUTPATIENT)
Dept: PHYSICAL THERAPY | Facility: REHABILITATION | Age: 42
End: 2022-12-07

## 2022-12-07 DIAGNOSIS — M25.512 CHRONIC LEFT SHOULDER PAIN: Primary | ICD-10-CM

## 2022-12-07 DIAGNOSIS — G89.29 CHRONIC LEFT SHOULDER PAIN: Primary | ICD-10-CM

## 2022-12-07 DIAGNOSIS — M75.42 IMPINGEMENT SYNDROME OF LEFT SHOULDER: ICD-10-CM

## 2022-12-07 DIAGNOSIS — G89.29 CHRONIC RIGHT SHOULDER PAIN: ICD-10-CM

## 2022-12-07 DIAGNOSIS — M25.511 CHRONIC RIGHT SHOULDER PAIN: ICD-10-CM

## 2022-12-07 NOTE — PROGRESS NOTES
Daily Note     Today's date: 2022  Patient name: Marilin Bond  : 1980  MRN: [id number]  Referring provider: Demetris Cruz DO  Dx:   Encounter Diagnosis     ICD-10-CM    1  Chronic left shoulder pain  M25 512     G89 29       2  Chronic right shoulder pain  M25 511     G89 29       3  Impingement syndrome of left shoulder  M75 42                      Subjective: pt reports hos shoulder have been feeling better, R shoulder feels better than the R       Objective: See treatment diary below    Muscle spasm in the upper trapezius  Tenderness of the biceps, supraspinatus and teres major  Tenderness     Left Shoulder   Tenderness in the [facility] joint  Right Shoulder  Tenderness in the bicipital groove, infraspinatus tendon, subacromial bursa and supraspinatus tendon  Cervical/Thoracic Screen   Cervical range of motion within normal limits with the following exceptions: Limited 25% in SB w/right sided pain in both directions    Right sided pain with right rotation and extension    Active Range of Motion   Left Shoulder   Flexion: 150 degrees with pain  Abduction: 150 degrees with pain  External rotation BTH: T3 with pain  Internal rotation BTB: T10 with pain    Right Shoulder   Flexion: 170 degrees with pain  Abduction: 170 degrees with pain  External rotation BTH: T3   Internal rotation BTB: T5     Passive Range of Motion   Left Shoulder   Flexion: 170 degrees with pain  Abduction: 170degrees with pain  External rotation 90°: 65 degrees with pain  Internal rotation 90°: 80 degrees with pain    Right Shoulder   Flexion: WFL  Abduction: WFL  External rotation 90°: WFL   Internal rotation 90°: WFL    Joint Play   Left Shoulder  Hypomobile ant and post capsule    Right Shoulder  WNL              Strength/Myotome Testing     Left Shoulder     Planes of Motion   Flexion: 5  Abduction: 5   External rotation at 0°: 5   Internal rotation at 0°: 5     Isolated Muscles   Biceps: 5   Triceps: 5     Right Shoulder Planes of Motion   Flexion: 5   Abduction: 5   External rotation at 0°: 5   Internal rotation at 0°: 5     Isolated Muscles   Biceps: 5   Triceps: 5          Assessment: Tolerated treatment well  Pt is demonstrating significant improvement in ROM and strength  He continues to have end range pain on the right in flexion and abduction, mildly limited L shoulder AROM  He continues to have mild limitation and pain and would benefit from continued PT  Goals  STG: in 4 week  ROM WNL  Performing HEP consistently  LTG: by discharge  Able to reach out to the side without pain  Able to reach back without pain  Able to do heavy household work without increased pain    Plan: Continue per plan of care  Progress treatment as tolerated         Precautions: none     Daily Treatment Diary      Assessment  11/8  11/15  11/17  11/22  11/28  11/30  12/5         Eval/Reval                       FOTO         **  do Foto  x     **   Manuals    GH mob NT  [address]  Glides JG  TE glides post/inf & AC L shldr  NT gr IV inf and post  B Shoulders  MD  B shoulders NT            Gentle L sh PROM JG L shldr  TE NT        B Shoulder PROM      MD TE      recip stab     60"x2        MWM GH w/scaption     NT        Exercise Diary     Upper Thoracic Extension Stretch            NV  10"x5         Supine 1/2 Foam Pec Stretch       NV 1'x2 1'x2     1/2 Foam Roller Series       NV 1' ea 1' ea      cerv retr x10  10x   5"x15  5"x15  5"x10              cerv retr and ext x10  10x   10x  x10  x10              scap retr x10  10x5"   5"x20  5"x20                supine shoulder wand flex x10    x10  5"x10  5"x10              towel IR stretch 10"x5    10"x5    20"x3  Strap   30"x3 ea    HEP          Pec/Biceps Wall Stretch      30"x3 ea  HEP       pec stretch    20"x3   15"x5  30"x3  30"x3  Corner   30"x3  HEP          TB B ER    BTB 10x5"  BTB 5"x15  BTB 5"x20 Blue loop 5" 2x10              TB rows/ext    10x3 BTB  Black 2x10  black 5"x20  20# rows 2x10, 15# 2x10      20# 2x10        UBE   BW 6'   retro 6' L2  retro 6' L2  3'fwd, 3'back  L3  3'/3'  L3  3'/3'  L3 3'/3'                                      IR with wand x10                  SL flex & abd         2x10              supine alphabet         1x              TB Rows            Green   5"  1x10  green 5"2x10          TB H ABD      Orange  5"  1x10 green 5"2x10 Black at [employer] slide 2x10      TB Ext            Green   5"  1x10  green 5"2x10         Modalities    ice PRN

## 2022-12-14 ENCOUNTER — OFFICE VISIT (OUTPATIENT)
Dept: PHYSICAL THERAPY | Facility: REHABILITATION | Age: 42
End: 2022-12-14

## 2022-12-14 DIAGNOSIS — M25.511 CHRONIC RIGHT SHOULDER PAIN: ICD-10-CM

## 2022-12-14 DIAGNOSIS — G89.29 CHRONIC LEFT SHOULDER PAIN: Primary | ICD-10-CM

## 2022-12-14 DIAGNOSIS — M25.512 CHRONIC LEFT SHOULDER PAIN: Primary | ICD-10-CM

## 2022-12-14 DIAGNOSIS — M75.42 IMPINGEMENT SYNDROME OF LEFT SHOULDER: ICD-10-CM

## 2022-12-14 DIAGNOSIS — G89.29 CHRONIC RIGHT SHOULDER PAIN: ICD-10-CM

## 2022-12-14 DIAGNOSIS — M75.41 IMPINGEMENT SYNDROME OF RIGHT SHOULDER: ICD-10-CM

## 2022-12-14 NOTE — PROGRESS NOTES
Daily Note     Today's date: 2022  Patient name: Silas Martinez  : 1980  MRN: [id number]  Referring provider: Agatha Rizzo DO  Dx:   Encounter Diagnosis     ICD-10-CM    1  Chronic left shoulder pain  M25 512     G89 29       2  Chronic right shoulder pain  M25 511     G89 29       3  Impingement syndrome of left shoulder  M75 42       4  Impingement syndrome of right shoulder  M75 41           Start Time: 905  Stop Time: 955  Total time in clinic (min): 50 minutes    Subjective: Patient reports that L shoulder only bothers him when doffing shirts  R shoulder is bothersome when he yawns  Overall shoulders are significantly improved since starting PT  Objective: See treatment diary below      Assessment: Patient continues to tolerate treatment well and is able to complete prescribed activity with adequate onset of fatigue but no reproduction of pain  Progress, as able  Plan: Continue per plan of care        Precautions: none     Daily Treatment Diary      Assessment  11/8  11/15  11/17  11/22  11/28  11/30  12/5  12/14       Eval/Reval                       FOTO         **  do Foto  x     **   Manuals    GH mob NT  [address]  Glides JG  TE glides post/inf & AC L shldr  NT gr IV inf and post  B Shoulders  MD  B shoulders NT  JAB          Gentle L sh PROM JG L shldr  TE NT        B Shoulder PROM      MD TE JAB     recip stab     60"x2        MWM GH w/scaption     NT        Exercise Diary     Upper Thoracic Extension Stretch            NV  10"x5 10"x5       Supine 1/2 Foam Pec Stretch       NV 1'x2 1'x2     1/2 Foam Roller Series       NV 1' ea 1' ea      cerv retr x10  10x   5"x15  5"x15  5"x10              cerv retr and ext x10  10x   10x  x10  x10              scap retr x10  10x5"   5"x20  5"x20                supine shoulder wand flex x10    x10  5"x10  5"x10              towel IR stretch 10"x5    10"x5    20"x3  Strap   30"x3 ea    HEP          Pec/Biceps Wall Stretch      30"x3 ea  HEP       pec stretch    20"x3   15"x5  30"x3  30"x3  Corner   30"x3  HEP          TB B ER    BTB 10x5"  BTB 5"x15  BTB 5"x20 Blue loop 5" 2x10              TB rows/ext    10x3 BTB  Black 2x10  black 5"x20  20# rows 2x10, 15# 2x10      nv        UBE   BW 6'   retro 6' L2  retro 6' L2  3'fwd, 3'back  L3  3'/3'  L3  3'/3'  L3 3'/3'                                      IR with wand x10                  SL flex & abd         2x10              supine alphabet         1x              TB Rows            Green   5"  1x10  green 5"2x10  green 5" 2x10        TB H ABD      Orange  5"  1x10 green 5"2x10 Black at [employer] slide 2x10      TB Ext            Green   5"  1x10  green 5"2x10 Green 5" 2x10       Modalities    ice PRN

## 2022-12-21 ENCOUNTER — OFFICE VISIT (OUTPATIENT)
Dept: PHYSICAL THERAPY | Facility: REHABILITATION | Age: 42
End: 2022-12-21

## 2022-12-21 DIAGNOSIS — G89.29 CHRONIC LEFT SHOULDER PAIN: Primary | ICD-10-CM

## 2022-12-21 DIAGNOSIS — M25.512 CHRONIC LEFT SHOULDER PAIN: Primary | ICD-10-CM

## 2022-12-21 DIAGNOSIS — M25.511 CHRONIC RIGHT SHOULDER PAIN: ICD-10-CM

## 2022-12-21 DIAGNOSIS — M75.41 IMPINGEMENT SYNDROME OF RIGHT SHOULDER: ICD-10-CM

## 2022-12-21 DIAGNOSIS — M75.42 IMPINGEMENT SYNDROME OF LEFT SHOULDER: ICD-10-CM

## 2022-12-21 DIAGNOSIS — G89.29 CHRONIC RIGHT SHOULDER PAIN: ICD-10-CM

## 2022-12-21 NOTE — PROGRESS NOTES
Daily Note     Today's date: 2022  Patient name: Mukul Marrero  : 1980  MRN: [id number]  Referring provider: Mulu Rivera DO  Dx:   Encounter Diagnosis     ICD-10-CM    1  Chronic left shoulder pain  M25 512     G89 29       2  Chronic right shoulder pain  M25 511     G89 29       3  Impingement syndrome of left shoulder  M75 42       4  Impingement syndrome of right shoulder  M75 41                      Subjective: Patient reports B shoulders are feeling better overall, states dull pain in R shoulder more bothersome than L  Objective: See treatment diary below      Assessment: Patient tolerates treatment well and is able to complete TE without increased pain  He notes some "clicking/popping" around R anterior shoulder with H abd which resolved with postural correction  Educated pt on progression of HEP, he verbalizes understanding  Continue to progress as tolerated  Pt would benefit from continued skilled PT to improve current deficits and maximize function  Plan: Continue per plan of care        Precautions: none     Daily Treatment Diary      Assessment  11/8  11/15  11/17  11/22  11/28  11/30  12/5  12/14  12/21     Eval/Reval                       FOTO         **  do Foto  x     **   Manuals    GH mob NT  [address]  Glides JG  TE glides post/inf & AC L shldr  NT gr IV inf and post  B Shoulders  MD  B shoulders NT  JAB  SC  glides        Gentle L sh PROM JG L shldr  TE NT        B Shoulder PROM      MD TE JAB SC    recip stab     60"x2        MWM GH w/scaption     NT        Exercise Diary     Upper Thoracic Extension Stretch            NV  10"x5 10"x5  10"x5     Supine 1/2 Foam Pec Stretch       NV 1'x2 1'x2 1'x2    1/2 Foam Roller Series       NV 1' ea 1' ea 1' ea     cerv retr x10  10x   5"x15  5"x15  5"x10              cerv retr and ext x10  10x   10x  x10  x10              scap retr x10  10x5"   5"x20  5"x20                supine shoulder wand flex x10    x10  5"x10  5"x10              towel IR stretch 10"x5    10"x5    20"x3  Strap   30"x3 ea    HEP          Pec/Biceps Wall Stretch      30"x3 ea  HEP       pec stretch    20"x3   15"x5  30"x3  30"x3  Corner   30"x3  HEP          TB B ER    BTB 10x5"  BTB 5"x15  BTB 5"x20 Blue loop 5" 2x10              TB rows/ext    10x3 BTB  Black 2x10  black 5"x20  20# rows 2x10, 15# 2x10      nv        UBE   BW 6'   retro 6' L2  retro 6' L2  3'fwd, 3'back  L3  3'/3'  L3  3'/3'  L3 3'/3'  L3 3'/3'                                    IR with wand x10                  SL flex & abd         2x10              supine alphabet         1x              TB Rows            Green   5"  1x10  green 5"2x10  green 5" 2x10  blue 5"  2x10      TB H ABD      Orange  5"  1x10 green 5"2x10 Black at [employer] slide 2x10 Black  2x10     TB Ext            Green   5"  1x10  green 5"2x10 Green 5" 2x10  green 5" 2x10     Modalities    ice PRN

## 2022-12-28 ENCOUNTER — OFFICE VISIT (OUTPATIENT)
Dept: PHYSICAL THERAPY | Facility: REHABILITATION | Age: 42
End: 2022-12-28

## 2022-12-28 DIAGNOSIS — G89.29 CHRONIC LEFT SHOULDER PAIN: Primary | ICD-10-CM

## 2022-12-28 DIAGNOSIS — M25.512 CHRONIC LEFT SHOULDER PAIN: Primary | ICD-10-CM

## 2022-12-28 DIAGNOSIS — M25.511 CHRONIC RIGHT SHOULDER PAIN: ICD-10-CM

## 2022-12-28 DIAGNOSIS — G89.29 CHRONIC RIGHT SHOULDER PAIN: ICD-10-CM

## 2022-12-28 NOTE — PROGRESS NOTES
Daily Note     Today's date: 2022  Patient name: Cherie Collins  : 1980  MRN: [id number]  Referring provider: Ratna Rojas DO  Dx:   Encounter Diagnosis     ICD-10-CM    1  Chronic left shoulder pain  M25 512     G89 29       2  Chronic right shoulder pain  M25 511     G89 29                      Subjective: Pt reports occasional mild right shoulder pain depending on what he is doing  Still feels some discomfort with the "no money" exercises and towel stretch  Also notes continued pain into the right with yawning  Objective: See treatment diary below      Assessment: Pt's right shoulder pain appears related to the cervical region  Progressed retraction and extension to end range and added UT stretch along with manual therapy      Plan: Continue per plan of care        Precautions: none     Daily Treatment Diary      Assessment  11/8  11/15  11/17  11/22  11/28  11/30  12/5  12/14  12/21  12/28   Eval/Reval                       FOTO         **  do Foto  x     NV   Manuals    GH mob NT  [address]  Glides JG  TE glides post/inf & AC LI shldr  NT gr IV inf and post  B Shoulders  MD  B shoulders NT  JAB  SC  glides     KT tape I strip UT          NT   Cupping R UT          NT 1'      Gentle L sh PROM JG L shldr  TE NT        B Shoulder PROM      MD TE JAB SC    recip stab     60"x2        MWM GH w/scaption     NT     MWM w/IR 2x10   Exercise Diary     Upper Thoracic Extension Stretch            NV  10"x5 10"x5  10"x5     Supine 1/2 Foam Pec Stretch       NV 1'x2 1'x2 1'x2 1'x2   1/2 Foam Roller Series       NV 1' ea 1' ea 1' ea    UT stretch          20"x5    cerv retr x10  10x   5"x15  5"x15  5"x10              cerv retr and ext x10  10x   10x  x10  x10              scap retr x10  10x5"   5"x20  5"x20                supine shoulder wand flex x10    x10  5"x10  5"x10              towel IR stretch 10"x5    10"x5    20"x3  Strap   30"x3 ea    HEP          Pec/Biceps Wall Stretch      30"x3 ea  HEP       pec stretch    20"x3   15"x5  30"x3  30"x3  Corner   30"x3  HEP          TB B ER    BTB 10x5"  BTB 5"x15  BTB 5"x20 Blue loop 5" 2x10              TB rows/ext    10x3 BTB  Black 2x10  black 5"x20  20# rows 2x10, 15# 2x10      nv        UBE   BW 6'   retro 6' L2  retro 6' L2  3'fwd, 3'back  L3  3'/3'  L3  3'/3'  L3 3'/3'  L3 3'/3'  L3 3'/3'                                  IR with wand x10                  SL flex & abd         2x10          Standing 2# 2x10    supine alphabet         1x              TB Rows            Green   5"  1x10  green 5"2x10  green 5" 2x10  blue 5"  2x10  Black 5" 3x10    TB H ABD      Orange  5"  1x10 green 5"2x10 Black at [employer] slide 2x10 Black  2x10 Black 3x10    TB Ext            Green   5"  1x10  green 5"2x10 Green 5" 2x10  green 5" 2x10  Purple 3x10   Modalities    ice PRN

## 2023-01-04 ENCOUNTER — OFFICE VISIT (OUTPATIENT)
Dept: PHYSICAL THERAPY | Facility: REHABILITATION | Age: 43
End: 2023-01-04

## 2023-01-04 DIAGNOSIS — M25.511 CHRONIC RIGHT SHOULDER PAIN: ICD-10-CM

## 2023-01-04 DIAGNOSIS — M75.42 IMPINGEMENT SYNDROME OF LEFT SHOULDER: ICD-10-CM

## 2023-01-04 DIAGNOSIS — G89.29 CHRONIC RIGHT SHOULDER PAIN: ICD-10-CM

## 2023-01-04 DIAGNOSIS — M75.41 IMPINGEMENT SYNDROME OF RIGHT SHOULDER: ICD-10-CM

## 2023-01-04 DIAGNOSIS — G89.29 CHRONIC LEFT SHOULDER PAIN: Primary | ICD-10-CM

## 2023-01-04 DIAGNOSIS — M25.512 CHRONIC LEFT SHOULDER PAIN: Primary | ICD-10-CM

## 2023-01-04 NOTE — PROGRESS NOTES
Daily Note     Today's date: 2023  Patient name: Errol Britton  : 1980  MRN: [id number]  Referring provider: Tomás Menard DO  Dx:   Encounter Diagnosis     ICD-10-CM    1  Chronic left shoulder pain  M25 512     G89 29       2  Chronic right shoulder pain  M25 511     G89 29       3  Impingement syndrome of left shoulder  M75 42       4  Impingement syndrome of right shoulder  M75 41             Subjective: Patient noted no new complaints  Patient noted feeling better since last visit patient attributed to possibly cupping or KT tape  Objective: See treatment diary below      Assessment: Tolerated treatment fair  Patient was able to perform listed exercises with correct form  Some redness and petechiae present post starting to  perform cupping to R UT therefore finished with STM to R UT today  KT tape to R UT  Patient would benefit from continued PT      Plan: Continue per plan of care        Precautions: none     Daily Treatment Diary      Assessment    Eval/Reval                     FOTO       **  do Foto  x     NV   Manuals    GH mob    Glides JG  TE glides post/inf & AC LI shldr  NT gr IV inf and post  B Shoulders  MD  B shoulders NT  JAB  SC  glides     KT tape I strip UT          NT   Cupping R UT          NT 1'      Gentle L sh PROM JG L shldr  TE NT        B Shoulder PROM      MD MEJIA JAB SC    recip stab     60"x2        MWM GH w/scaption     NT     MWM w/IR 2x10   Exercise Diary     Upper Thoracic Extension Stretch          NV  10"x5 10"x5  10"x5     Supine 1/2 Foam Pec Stretch 1' x 2      NV 1'x2 1'x2 1'x2 1'x2   1/2 Foam Roller Series 1' ea      NV 1' ea 1' ea 1' ea    UT stretch 20" x 5          20"x5    cerv retr    5"x15  5"x15  5"x10              cerv retr and ext    10x  x10  x10              scap retr    5"x20  5"x20                supine shoulder wand flex    x10  5"x10  5"x10              towel IR stretch    10"x5    20"x3 Strap   30"x3 ea    HEP          Pec/Biceps Wall Stretch      30"x3 ea  HEP       pec stretch    15"x5  30"x3  30"x3  Corner   30"x3  HEP          TB B ER    BTB 5"x15  BTB 5"x20 Blue loop 5" 2x10              TB rows/ext    Black 2x10  black 5"x20  20# rows 2x10, 15# 2x10      nv        UBE L3   3'3'   retro 6' L2  retro 6' L2  3'fwd, 3'back  L3  3'/3'  L3  3'/3'  L3 3'/3'  L3 3'/3'  L3 3'/3'                              IR with wand x10                  SL flex & abd standing 2 x 10 2#       2x10          Standing 2# 2x10    supine alphabet       1x              TB Rows Purple 5         Green   5"  1x10  green 5"2x10  green 5" 2x10  blue 5"  2x10  Black 5" 3x10    TB H ABD purple  3x10     Orange  5"  1x10 green 5"2x10 Black at [employer] slide 2x10 Black  2x10 Black 3x10    TB Ext Purple 3 x 10          Green   5"  1x10  green 5"2x10 Green 5" 2x10  green 5" 2x10  Purple 3x10   Modalities    ice PRN

## 2023-01-11 ENCOUNTER — OFFICE VISIT (OUTPATIENT)
Dept: PHYSICAL THERAPY | Facility: REHABILITATION | Age: 43
End: 2023-01-11

## 2023-01-11 DIAGNOSIS — G89.29 CHRONIC RIGHT SHOULDER PAIN: ICD-10-CM

## 2023-01-11 DIAGNOSIS — M75.42 IMPINGEMENT SYNDROME OF LEFT SHOULDER: ICD-10-CM

## 2023-01-11 DIAGNOSIS — M25.512 CHRONIC LEFT SHOULDER PAIN: Primary | ICD-10-CM

## 2023-01-11 DIAGNOSIS — G89.29 CHRONIC LEFT SHOULDER PAIN: Primary | ICD-10-CM

## 2023-01-11 DIAGNOSIS — M75.41 IMPINGEMENT SYNDROME OF RIGHT SHOULDER: ICD-10-CM

## 2023-01-11 DIAGNOSIS — M25.511 CHRONIC RIGHT SHOULDER PAIN: ICD-10-CM

## 2023-01-11 NOTE — PROGRESS NOTES
Daily Note     Today's date: 2023  Patient name: Magalie Islas  : 1980  MRN: [id number]  Referring provider: Charanjit Everett DO  Dx:   Encounter Diagnosis     ICD-10-CM    1  Chronic left shoulder pain  M25 512     G89 29       2  Chronic right shoulder pain  M25 511     G89 29       3  Impingement syndrome of left shoulder  M75 42       4  Impingement syndrome of right shoulder  M75 41                      Subjective: pt reports decreased pain in shoulder and UT region since beginning cupping and taping  He noted pain in UT region while yawning, but has diminished since beginning cupping treatments  Objective: See treatment diary below      Assessment: Tolerated treatment well  Good tolerance with exercises and manuals  Denied adverse reactions with exercises  Significant soft tissue restrictions in UT and LS present with moderate petechiae upon completion; fair response while doing so  Patient demonstrated fatigue post treatment, exhibited good technique with therapeutic exercises and would benefit from continued PT      Plan: Continue per plan of care  Progress treatment as tolerated         Precautions: none     Daily Treatment Diary      Assessment    Eval/Reval                    FOTO      **  do Foto  x     NV   Manuals    GH mob     TE glides post/inf & AC LI shldr  NT gr IV inf and post  B Shoulders  MD  B shoulders NT  JAB  SC  glides     KT tape I strip UT  TE        NT   Cupping R UT  TE &LS        NT 1'       L shldr  TE NT        B Shoulder PROM      MD JACKIE MORAN SC    recip stab     60"x2        MWM GH w/scaption     NT     MWM w/IR 2x10   Exercise Diary     Upper Thoracic Extension Stretch          NV  10"x5 10"x5  10"x5     Supine 1/2 Foam Pec Stretch 1' x 2 1' x2     NV 1'x2 1'x2 1'x2 1'x2   1/2 Foam Roller Series 1' ea 1' ea     NV 1' ea 1' ea 1' ea    UT stretch 20" x 5  30"x3        20"x5    cerv retr     5"x15  5"x10 cerv retr and ext     x10  x10              scap retr     5"x20                supine shoulder wand flex     5"x10  5"x10              towel IR stretch      20"x3  Strap   30"x3 ea    HEP          Pec/Biceps Wall Stretch      30"x3 ea  HEP       pec stretch      30"x3  Corner   30"x3  HEP          TB B ER     Blue loop 5" 2x10              TB rows/ext      20# rows 2x10, 15# 2x10      nv        UBE L3   3'3' 3' ea   retro 6' L2  3'fwd, 3'back  L3  3'/3'  L3  3'/3'  L3 3'/3'  L3 3'/3'  L3 3'/3'                                              SL flex & abd standing 2 x 10 2#  Stand 5# 2x10 ea     2x10          Standing 2# 2x10    supine alphabet       1x              TB Rows Purple 5 Black 5"  3x10        Green   5"  1x10  green 5"2x10  green 5" 2x10  blue 5"  2x10  Black 5" 3x10    TB H ABD purple  3x10 Black 3x10    Orange  5"  1x10 green 5"2x10 Black at [employer] slide 2x10 Black  2x10 Black 3x10    TB Ext Purple 3 x 10  Black   3x10        Green   5"  1x10  green 5"2x10 Green 5" 2x10  green 5" 2x10  Purple 3x10   Modalities    ice PRN

## 2023-01-18 ENCOUNTER — OFFICE VISIT (OUTPATIENT)
Dept: PHYSICAL THERAPY | Facility: REHABILITATION | Age: 43
End: 2023-01-18

## 2023-01-18 DIAGNOSIS — M75.42 IMPINGEMENT SYNDROME OF LEFT SHOULDER: ICD-10-CM

## 2023-01-18 DIAGNOSIS — M25.512 CHRONIC LEFT SHOULDER PAIN: Primary | ICD-10-CM

## 2023-01-18 DIAGNOSIS — M25.511 CHRONIC RIGHT SHOULDER PAIN: ICD-10-CM

## 2023-01-18 DIAGNOSIS — M75.41 IMPINGEMENT SYNDROME OF RIGHT SHOULDER: ICD-10-CM

## 2023-01-18 DIAGNOSIS — G89.29 CHRONIC LEFT SHOULDER PAIN: Primary | ICD-10-CM

## 2023-01-18 DIAGNOSIS — G89.29 CHRONIC RIGHT SHOULDER PAIN: ICD-10-CM

## 2023-01-18 NOTE — PROGRESS NOTES
Daily Note     Today's date: 2023  Patient name: Stephanie Mooney  : 1980  MRN: [id number]  Referring provider: Kaylynn Mansfield DO  Dx:   Encounter Diagnosis     ICD-10-CM    1  Chronic left shoulder pain  M25 512     G89 29       2  Chronic right shoulder pain  M25 511     G89 29       3  Impingement syndrome of left shoulder  M75 42       4  Impingement syndrome of right shoulder  M75 41                      Subjective: pt reports his shoulder continues to feel much better, he still has some days/moments where it hurts more than others  It no longer bothers him when he runs  Objective: See treatment diary below      Assessment: Tolerated treatment well  Progressed some strengthening to doretha this visit with favorable response  Pt notes extensions are most difficult and can "feel it" in R UT and L arm towards the end, likely due to fatigue and could perform with decreased resistance nv  Performs using good technique with no adverse sx's  Pt would benefit from continued skilled PT to improve current deficits and maximize function  Plan: Continue per plan of care  Progress treatment as tolerated         Precautions: none     Daily Treatment Diary      Assessment    Eval/Reval                    FOTO      **  do Foto  x     NV   Manuals    GH mob     TE glides post/inf & AC LI shldr  NT gr IV inf and post  B Shoulders  MD  B shoulders NT  JAB  SC  glides     KT tape I strip UT  TE SC       NT   Cupping R UT  TE &LS SC       NT 1'       L shldr  TE NT        B Shoulder PROM      MD TE JAB SC    recip stab     60"x2        MWM GH w/scaption     NT     MWM w/IR 2x10   Exercise Diary     Upper Thoracic Extension Stretch          NV  10"x5 10"x5  10"x5     Supine 1/2 Foam Pec Stretch 1' x 2 1' x2 1'x2    NV 1'x2 1'x2 1'x2 1'x2   1/2 Foam Roller Series 1' ea 1' ea 1' ea    NV 1' ea 1' ea 1' ea    UT stretch 20" x 5  30"x3 30"x3       20"x5    cerv retr     5"x15  5"x10              cerv retr and ext     x10  x10              scap retr     5"x20                supine shoulder wand flex     5"x10  5"x10              towel IR stretch      20"x3  Strap   30"x3 ea    HEP          Pec/Biceps Wall Stretch      30"x3 ea  HEP       pec stretch      30"x3  Corner   30"x3  HEP          TB B ER     Blue loop 5" 2x10              TB rows/ext      20# rows 2x10, 15# 2x10      nv        UBE L3   3'3' 3' ea L3 3'/3'  retro 6' L2  3'fwd, 3'back  L3  3'/3'  L3  3'/3'  L3 3'/3'  L3 3'/3'  L3 3'/3'                                              SL flex & abd standing 2 x 10 2#  Stand 5# 2x10 ea  stand 5#  2x10 ea   2x10          Standing 2# 2x10    supine alphabet       1x              TB Rows Purple 5 Black 5"  3x10  doretha  20 5#  5" 2x10      Green   5"  1x10  green 5"2x10  green 5" 2x10  blue 5"  2x10  Black 5" 3x10    TB H ABD purple  3x10 Black 3x10 Black  3x10   Orange  5"  1x10 green 5"2x10 Black at [employer] slide 2x10 Black  2x10 Black 3x10    TB Ext Purple 3 x 10  Black   3x10  doretha  20 5#  2x10      Green   5"  1x10  green 5"2x10 Green 5" 2x10  green 5" 2x10  Purple 3x10   Modalities    ice PRN

## 2023-01-25 ENCOUNTER — OFFICE VISIT (OUTPATIENT)
Dept: PHYSICAL THERAPY | Facility: REHABILITATION | Age: 43
End: 2023-01-25

## 2023-01-25 DIAGNOSIS — M25.511 CHRONIC RIGHT SHOULDER PAIN: ICD-10-CM

## 2023-01-25 DIAGNOSIS — M25.512 CHRONIC LEFT SHOULDER PAIN: Primary | ICD-10-CM

## 2023-01-25 DIAGNOSIS — G89.29 CHRONIC RIGHT SHOULDER PAIN: ICD-10-CM

## 2023-01-25 DIAGNOSIS — G89.29 CHRONIC LEFT SHOULDER PAIN: Primary | ICD-10-CM

## 2023-01-25 NOTE — PROGRESS NOTES
Daily Note     Today's date: 2023  Patient name: Iwona Santillan  : 1980  MRN: [id number]  Referring provider: Bre Fink DO  Dx:   Encounter Diagnosis     ICD-10-CM    1  Chronic left shoulder pain  M25 512     G89 29       2  Chronic right shoulder pain  M25 511     G89 29                      Subjective: Pt states that his shoulders are much better  He feels occasional discomfort if he reaches behind his back into "IR"  No longer has pain with yawning  Objective: See treatment diary below  Active Range of Motion   Left Shoulder   Flexion: 170 degrees   Abduction: 170 degrees   External rotation BTH: T3   Internal rotation BTB: T10     Right Shoulder   Flexion: 170 degrees   Abduction: 170 degrees   External rotation BTH: T3   Internal rotation BTB: T5     Passive Range of Motion   Left Shoulder   Flexion: 170 degrees   Abduction: 170degrees   External rotation 90°: WFL  Internal rotation 90°: WFL    Right Shoulder   Flexion: WFL  Abduction: WFL  External rotation 90°: WFL   Internal rotation 90°: WFL      Strength/Myotome Testing     Left Shoulder     Planes of Motion   Flexion: 5  Abduction: 5   External rotation at 0°: 5   Internal rotation at 0°: 5     Isolated Muscles   Biceps: 5   Triceps: 5     Right Shoulder     Planes of Motion   Flexion: 5   Abduction: 5   External rotation at 0°: 5   Internal rotation at 0°: 5     Isolated Muscles   Biceps: 5   Triceps: 5      Reviewed safe use of his free weights at home and updated his HEP  Assessment: Iwona Santillan has been compliant with attending PT and home exercise program since initial eval   Eduardo Holley  has made improvements in objective data since initial evalulation and has achieved all goals  Patient reports having returned to their prior level or function  Patient provided with updated Home Exercise Program, all questions answered, verbalized understanding and agreement to plan of care   Thus it was mutually decided to discontinue this episode of care and transition to Home Exercise Program       Goals  STG: in 4 week  ROM WNL  Performing HEP consistently  LTG: by discharge  Able to reach out to the side without pain  Able to reach back without pain  Able to do heavy household work without increased pain   Plan: D/C     Precautions: none     Daily Treatment Diary      Assessment 1/4 1/11 1/18 1/26 11/28 11/30 12/5 12/14 12/21 12/28   Eval/Reval                    FOTO      **  do Foto  x     NV   Manuals    GH mob       NT gr IV inf and post  B Shoulders  MD  B shoulders NT  JAB  SC  glides     KT tape I strip UT  TE SC       NT   Cupping R UT  TE &LS SC NT      NT 1'        NT        B Shoulder PROM      MD TE JAB SC    recip stab     60"x2        MWM GH w/scaption     NT     MWM w/IR 2x10   Exercise Diary     Upper Thoracic Extension Stretch          NV  10"x5 10"x5  10"x5     Supine 1/2 Foam Pec Stretch 1' x 2 1' x2 1'x2 1'x2 1'x2  NV 1'x2 1'x2 1'x2 1'x2   1/2 Foam Roller Series 1' ea 1' ea 1' ea 1'ea 1'ea  NV 1' ea 1' ea 1' ea    UT stretch 20" x 5  30"x3 30"x3 30"x3 30"x3     20"x5    cerv retr      5"x10              cerv retr and ext      x10              scap retr                    supine shoulder wand flex      5"x10              towel IR stretch      20"x3  Strap   30"x3 ea    HEP          Pec/Biceps Wall Stretch      30"x3 ea  HEP       pec stretch      30"x3  Corner   30"x3  HEP          TB B ER     Blue loop 5" 2x10              TB rows/ext      20# rows 2x10, 15# 2x10      nv        UBE L3   3'3' 3' ea L3 3'/3'  L3 3'/3'  3'fwd, 3'back  L3  3'/3'  L3  3'/3'  L3 3'/3'  L3 3'/3'  L3 3'/3'    elbow curls                     bent over rows    10# x10                SL flex & abd standing 2 x 10 2#  Stand 5# 2x10 ea  stand 5#  2x10 ea  standing 5# 2x10 2x10          Standing 2# 2x10    supine alphabet       1x              TB Rows Purple 5 Black 5"  3x10  khloe  20 5#  5" 2x10  Khloe 22# x20    Green   5"  1x10  green 5"2x10  green 5" 2x10  blue 5"  2x10  Black 5" 3x10    TB H ABD purple  3x10 Black 3x10 Black  3x10 Black 3x10  Orange  5"  1x10 green 5"2x10 Black at [employer] slide 2x10 Black  2x10 Black 3x10    TB Ext Purple 3 x 10  Black   3x10  doretha  20 5#  2x10  [place] 20# x10    Green   5"  1x10  green 5"2x10 Green 5" 2x10  green 5" 2x10  Purple 3x10   Modalities    ice PRN                                       Access Code: [email or web address]  URL: [email or web address]  Date: 01/25/2023  Prepared by: Nan Monroe    Exercises  • Bent Over Single Arm Shoulder Row with Dumbbell - 1 x daily - 7 x weekly - 3 sets - 10 reps - 10# hold  • Standing Shoulder Horizontal Abduction with Resistance - 1 x daily - 7 x weekly - 3 sets - 10 reps  • Standing Bicep Curls Supinated with Dumbbells - 1 x daily - 7 x weekly - 3 sets - 10 reps - 15# hold  • Standing Bent Over Triceps Extension - 1 x daily - 7 x weekly - 3 sets - 10 reps - 10# hold  • Standing Shoulder Flexion to 90 Degrees with Dumbbells - 1 x daily - 7 x weekly - 2 sets - 10 reps - 5 # hold  • Shoulder Abduction with Dumbbells - Thumbs Up - 1 x daily - 7 x weekly - 2 sets - 10 reps - 5 # hold

## 2023-04-28 ENCOUNTER — OFFICE VISIT (OUTPATIENT)
Dept: FAMILY MEDICINE CLINIC | Facility: CLINIC | Age: 43
End: 2023-04-28

## 2023-04-28 VITALS
SYSTOLIC BLOOD PRESSURE: 114 MMHG | RESPIRATION RATE: 16 BRPM | HEART RATE: 60 BPM | DIASTOLIC BLOOD PRESSURE: 72 MMHG | WEIGHT: 164 LBS | HEIGHT: 70 IN | TEMPERATURE: 97.9 F | OXYGEN SATURATION: 99 % | BODY MASS INDEX: 23.48 KG/M2

## 2023-04-28 DIAGNOSIS — E78.49 OTHER HYPERLIPIDEMIA: ICD-10-CM

## 2023-04-28 DIAGNOSIS — R73.9 HYPERGLYCEMIA: ICD-10-CM

## 2023-04-28 DIAGNOSIS — Z12.11 ENCOUNTER FOR COLORECTAL CANCER SCREENING: ICD-10-CM

## 2023-04-28 DIAGNOSIS — R09.81 NASAL CONGESTION: Primary | ICD-10-CM

## 2023-04-28 DIAGNOSIS — Z00.00 HEALTHCARE MAINTENANCE: ICD-10-CM

## 2023-04-28 DIAGNOSIS — Z12.12 ENCOUNTER FOR COLORECTAL CANCER SCREENING: ICD-10-CM

## 2023-04-28 RX ORDER — FLUTICASONE PROPIONATE 50 MCG
2 SPRAY, SUSPENSION (ML) NASAL DAILY
Qty: 16 G | Refills: 0 | Status: SHIPPED | OUTPATIENT
Start: 2023-04-28

## 2023-04-28 NOTE — ASSESSMENT & PLAN NOTE
5/5/22 lipid panel, HDL slightly low 37  Encourage fish intake, avocado, olive oil, almond oil  Continue to monitor lipid panel  Repeat labs and follow up in 1 year

## 2023-04-28 NOTE — ASSESSMENT & PLAN NOTE
Difficulty breathing b/l nasal passages (L>R)  Going on for years, progressively worsening   Physical exam unremarkable, slight deviated septum to left  Referral to ENT  Recommend using flonase

## 2023-04-28 NOTE — PROGRESS NOTES
Name: Carmel Ulloa      : 1980      MRN: [id number]  Encounter Provider: Alexander Ramirez MD  Encounter Date: 2023   Encounter department: [address] [place] [address]  Nasal congestion  Assessment & Plan:  Difficulty breathing b/l nasal passages (L>R)  Going on for years, progressively worsening   Physical exam unremarkable, slight deviated septum to left  Referral to ENT  Recommend using flonase    Orders:  -     fluticasone (FLONASE) 50 mcg/act nasal spray; 2 sprays into each nostril daily  -     Ambulatory Referral to Otolaryngology; Future    2  Other hyperlipidemia  Assessment & Plan:  22 lipid panel, HDL slightly low 37  Encourage fish intake, avocado, olive oil, almond oil  Continue to monitor lipid panel  Repeat labs and follow up in 1 year      3  Hyperglycemia  Assessment & Plan:  22 fasting glucose 100  Continue to monitor fasting glucose and A1C  Repeat labs and follow up in 1 year    Orders:  -     Hemoglobin A1C; Future    4  Healthcare maintenance  Assessment & Plan: It was discussed about immunizations, diet, exercise and safety measures  Orders:  -     CBC and differential; Future  -     Comprehensive metabolic panel; Future  -     Lipid Panel with Direct LDL reflex; Future  -     TSH, 3rd generation with Free T4 reflex; Future    5  Encounter for colorectal cancer screening  -     Ambulatory Referral to Gastroenterology; Future           Subjective     R M  is a 38 y o  male with no significant medical history presenting today for annual physical  Complains of difficulty breathing through nose bilaterally (L>R)  Feels nasal congestion/obstruction  Has been going on for years but is progressively worsening  Is continuously blowing nose and has been getting nose bleeds as a result  No other URI symptoms noted  Uses breath right strips at night with some relief  Never saw ENT       Review of Systems   Constitutional: Negative for fatigue and fever    HENT: Positive for congestion (nasal congestion) and nosebleeds  Negative for rhinorrhea, sinus pressure, sinus pain, sneezing and sore throat  Eyes: Negative for visual disturbance  Respiratory: Negative for cough and shortness of breath  Cardiovascular: Negative for chest pain and palpitations  Gastrointestinal: Negative for abdominal pain, constipation, diarrhea, nausea and vomiting  Genitourinary: Negative for difficulty urinating and dysuria  Musculoskeletal: Negative for arthralgias and myalgias  Neurological: Negative for dizziness and headaches  Past Medical History:   Diagnosis Date   • Gilbert's syndrome      History reviewed  No pertinent surgical history  Family History   Problem Relation Age of Onset   • Colon cancer Father    • Diabetes Maternal Grandmother    • Arthritis Maternal Grandfather    • Diabetes Paternal Grandfather    • Dementia Paternal Grandfather      Social History     Socioeconomic History   • Marital status: /Civil Union     Spouse name: None   • Number of children: None   • Years of education: None   • Highest education level: None   Occupational History   • None   Tobacco Use   • Smoking status: Never   • Smokeless tobacco: Never   Vaping Use   • Vaping Use: Never used   Substance and Sexual Activity   • Alcohol use: Yes   • Drug use: None   • Sexual activity: None   Other Topics Concern   • None   Social History Narrative   • None     Social Determinants of Health     Financial Resource Strain: Not on file   Food Insecurity: Not on file   Transportation Needs: Not on file   Physical Activity: Not on file   Stress: Not on file   Social Connections: Not on file   Intimate Partner Violence: Not on file   Housing Stability: Not on file     No current outpatient medications on file prior to visit       No Known Allergies  Immunization History   Administered Date(s) Administered   • INFLUENZA 10/04/2014, 11/14/2015   • Td (adult), adsorbed 06/11/2018 "  • Tdap 04/22/2021       Objective     /72 (BP Location: Left arm, Patient Position: Sitting, Cuff Size: Adult)   Pulse 60   Temp 97 9 °F (36 6 °C) (Tympanic)   Resp 16   Ht 5' 10\" (1 778 m)   Wt 74 4 kg (164 lb)   SpO2 99%   BMI 23 53 kg/m²     Physical Exam  Constitutional:       Appearance: Normal appearance  HENT:      Head: Normocephalic and atraumatic  Right Ear: Tympanic membrane, ear canal and external ear normal       Left Ear: Tympanic membrane, ear canal and external ear normal       Ears:      Comments: Mild fluid behind TM b/l     Nose: Nose normal  No congestion or rhinorrhea  Comments: Mild deviated septum to left     Mouth/Throat:      Mouth: Mucous membranes are moist       Pharynx: Oropharynx is clear  Eyes:      Extraocular Movements: Extraocular movements intact  Conjunctiva/sclera: Conjunctivae normal    Cardiovascular:      Rate and Rhythm: Normal rate and regular rhythm  Heart sounds: No murmur heard  Pulmonary:      Effort: Pulmonary effort is normal       Breath sounds: Normal breath sounds  Abdominal:      Palpations: Abdomen is soft  Tenderness: There is no abdominal tenderness  Musculoskeletal:         General: No swelling  Right lower leg: No edema  Left lower leg: No edema  Skin:     General: Skin is warm  Findings: No rash  Neurological:      Mental Status: He is alert and oriented to person, place, and time     Psychiatric:         Mood and Affect: Mood normal          Behavior: Behavior normal        Magaly Ho MD  "

## 2023-04-28 NOTE — PROGRESS NOTES
Name: Richmond Villalobos      : 1980      MRN: [id number]  Encounter Provider: Kiesha Grove MD  Encounter Date: 2023   Encounter department: [address] [place] [address]  Nasal congestion  Assessment & Plan:  Difficulty breathing b/l nasal passages (L>R)  Going on for years, progressively worsening   Physical exam unremarkable, slight deviated septum to left  Referral to ENT  Recommend using flonase    Orders:  -     fluticasone (FLONASE) 50 mcg/act nasal spray; 2 sprays into each nostril daily  -     Ambulatory Referral to Otolaryngology; Future    2  Other hyperlipidemia  Assessment & Plan:  22 lipid panel, HDL slightly low 37  Encourage fish intake, avocado, olive oil, almond oil  Continue to monitor lipid panel  Repeat labs and follow up in 1 year      3  Hyperglycemia  Assessment & Plan:  22 fasting glucose 100  Continue to monitor fasting glucose and A1C  Repeat labs and follow up in 1 year    Orders:  -     Hemoglobin A1C; Future    4  Healthcare maintenance  Assessment & Plan: It was discussed about immunizations, diet, exercise and safety measures  Orders:  -     CBC and differential; Future  -     Comprehensive metabolic panel; Future  -     Lipid Panel with Direct LDL reflex; Future  -     TSH, 3rd generation with Free T4 reflex; Future    5  Encounter for colorectal cancer screening  -     Ambulatory Referral to Gastroenterology; Future      Depression Screening and Follow-up Plan: Patient was screened for depression during today's encounter  They screened negative with a PHQ-2 score of 0  Subjective     R M  is a 38 y o  male with no significant medical history presenting today for annual physical  Complains of difficulty breathing through nose bilaterally (L>R)  Feels nasal congestion/obstruction  Has been going on for years but is progressively worsening  Is continuously blowing nose and has been getting nose bleeds as a result   No other URI symptoms noted  Uses breath right strips at night with some relief  Never saw ENT  Review of Systems   Constitutional: Negative for fatigue and fever  HENT: Positive for congestion (nasal congestion) and nosebleeds  Negative for rhinorrhea, sinus pressure, sinus pain, sneezing and sore throat  Eyes: Negative for visual disturbance  Respiratory: Negative for cough and shortness of breath  Cardiovascular: Negative for chest pain and palpitations  Gastrointestinal: Negative for abdominal pain, constipation, diarrhea, nausea and vomiting  Genitourinary: Negative for difficulty urinating and dysuria  Musculoskeletal: Negative for arthralgias and myalgias  Neurological: Negative for dizziness and headaches  Past Medical History:   Diagnosis Date   • Gilbert's syndrome      History reviewed  No pertinent surgical history  Family History   Problem Relation Age of Onset   • Colon cancer Father    • Diabetes Maternal Grandmother    • Arthritis Maternal Grandfather    • Diabetes Paternal Grandfather    • Dementia Paternal Grandfather      Social History     Socioeconomic History   • Marital status: /Civil Union     Spouse name: None   • Number of children: None   • Years of education: None   • Highest education level: None   Occupational History   • None   Tobacco Use   • Smoking status: Never   • Smokeless tobacco: Never   Vaping Use   • Vaping Use: Never used   Substance and Sexual Activity   • Alcohol use:  Yes   • Drug use: None   • Sexual activity: None   Other Topics Concern   • None   Social History Narrative   • None     Social Determinants of Health     Financial Resource Strain: Not on file   Food Insecurity: Not on file   Transportation Needs: Not on file   Physical Activity: Not on file   Stress: Not on file   Social Connections: Not on file   Intimate Partner Violence: Not on file   Housing Stability: Not on file     No current outpatient medications on file prior to "visit  No Known Allergies  Immunization History   Administered Date(s) Administered   • INFLUENZA 10/04/2014, 11/14/2015   • Td (adult), adsorbed 06/11/2018   • Tdap 04/22/2021       Objective     /72 (BP Location: Left arm, Patient Position: Sitting, Cuff Size: Adult)   Pulse 60   Temp 97 9 °F (36 6 °C) (Tympanic)   Resp 16   Ht 5' 10\" (1 778 m)   Wt 74 4 kg (164 lb)   SpO2 99%   BMI 23 53 kg/m²     Physical Exam  Constitutional:       Appearance: Normal appearance  HENT:      Head: Normocephalic and atraumatic  Right Ear: Tympanic membrane, ear canal and external ear normal       Left Ear: Tympanic membrane, ear canal and external ear normal       Ears:      Comments: Mild fluid behind TM b/l     Nose: Nose normal  No congestion or rhinorrhea  Comments: Mild deviated septum to left     Mouth/Throat:      Mouth: Mucous membranes are moist       Pharynx: Oropharynx is clear  Eyes:      Extraocular Movements: Extraocular movements intact  Conjunctiva/sclera: Conjunctivae normal    Cardiovascular:      Rate and Rhythm: Normal rate and regular rhythm  Heart sounds: No murmur heard  Pulmonary:      Effort: Pulmonary effort is normal       Breath sounds: Normal breath sounds  Abdominal:      Palpations: Abdomen is soft  Tenderness: There is no abdominal tenderness  Musculoskeletal:         General: No swelling  Right lower leg: No edema  Left lower leg: No edema  Skin:     General: Skin is warm  Findings: No rash  Neurological:      Mental Status: He is alert and oriented to person, place, and time     Psychiatric:         Mood and Affect: Mood normal          Behavior: Behavior normal        Carlos Whelan MD  "

## 2023-04-28 NOTE — ASSESSMENT & PLAN NOTE
5/5/22 fasting glucose 100  Continue to monitor fasting glucose and A1C  Repeat labs and follow up in 1 year

## 2023-06-27 PROBLEM — Z00.00 HEALTHCARE MAINTENANCE: Status: RESOLVED | Noted: 2021-04-22 | Resolved: 2023-06-27

## 2023-07-26 ENCOUNTER — OFFICE VISIT (OUTPATIENT)
Dept: GASTROENTEROLOGY | Facility: CLINIC | Age: 43
End: 2023-07-26
Payer: COMMERCIAL

## 2023-07-26 VITALS
WEIGHT: 161.4 LBS | DIASTOLIC BLOOD PRESSURE: 72 MMHG | SYSTOLIC BLOOD PRESSURE: 114 MMHG | TEMPERATURE: 98.2 F | HEIGHT: 70 IN | BODY MASS INDEX: 23.11 KG/M2

## 2023-07-26 DIAGNOSIS — Z12.11 COLON CANCER SCREENING: Primary | ICD-10-CM

## 2023-07-26 PROCEDURE — 99243 OFF/OP CNSLTJ NEW/EST LOW 30: CPT | Performed by: INTERNAL MEDICINE

## 2023-07-26 RX ORDER — POLYETHYLENE GLYCOL 3350, SODIUM SULFATE ANHYDROUS, SODIUM BICARBONATE, SODIUM CHLORIDE, POTASSIUM CHLORIDE 236; 22.74; 6.74; 5.86; 2.97 G/4L; G/4L; G/4L; G/4L; G/4L
4000 POWDER, FOR SOLUTION ORAL ONCE
Qty: 4000 ML | Refills: 0 | Status: SHIPPED | OUTPATIENT
Start: 2023-07-26 | End: 2023-07-26

## 2023-07-26 NOTE — PROGRESS NOTES
[facility] - Outpatient Consultation  Anthony Cedillo 41 y.o. male MRN: [id number]  Encounter: [id number]          ASSESSMENT AND PLAN:    Anthony Cedillo is a 41 y.o. male with PMH of HLD, FH of CRC presenting for colon cancer screening discussion. FH of CRC, Colon Cancer Screening  - Colonoscopy now  - Golytely/dulcolax prep  - q5 year follow up maximum interval    1. Colon cancer screening        Orders Placed This Encounter   Procedures   • Colonoscopy     ______________________________________________________________________    HPI:    Anthony Cedillo is a 41 y.o. male with PMH of HLD presenting for discussion on CRC screening. No weight loss, no change in bowel habits, no blood in stools, no abdominal pain, no nausea, no emesis. FH of CRC in his father, dx at age 48. Pt's father passed away by age 61. ROS wright negative otherwise. REVIEW OF SYSTEMS:    CONSTITUTIONAL: Denies any fever, chills, rigors, and weight loss. HEENT: No earache or tinnitus. Denies hearing loss or visual disturbances. CARDIOVASCULAR: No chest pain or palpitations. RESPIRATORY: Denies any cough, hemoptysis, shortness of breath or dyspnea on exertion. GASTROINTESTINAL: As noted in the History of Present Illness. GENITOURINARY: No problems with urination. Denies any hematuria or dysuria. NEUROLOGIC: No dizziness or vertigo, denies headaches. MUSCULOSKELETAL: Denies any muscle or joint pain. SKIN: Denies skin rashes or itching. ENDOCRINE: Denies excessive thirst. Denies intolerance to heat or cold. PSYCHOSOCIAL: Denies depression or anxiety. Denies any recent memory loss. Historical Information   Past Medical History:   Diagnosis Date   • Gilbert's syndrome      History reviewed. No pertinent surgical history.   Social History   Social History     Substance and Sexual Activity   Alcohol Use Yes    Comment: occasionally     Social History     Substance and Sexual Activity   Drug Use Never     Social History     Tobacco Use   Smoking Status Never   Smokeless Tobacco Never     Family History   Problem Relation Age of Onset   • Colon cancer Father 48   • Diabetes Maternal Grandmother    • Arthritis Maternal Grandfather    • Diabetes Paternal Grandfather    • Dementia Paternal Grandfather        Meds/Allergies       Current Outpatient Medications:   •  fluticasone (FLONASE) 50 mcg/act nasal spray    No Known Allergies        Objective     Blood pressure 114/72, temperature 98.2 °F (36.8 °C), temperature source Tympanic, height 5' 10" (1.778 m), weight 73.2 kg (161 lb 6.4 oz). Body mass index is 23.16 kg/m². Wt Readings from Last 3 Encounters:   07/26/23 73.2 kg (161 lb 6.4 oz)   04/28/23 74.4 kg (164 lb)   11/04/22 74.8 kg (165 lb)        PHYSICAL EXAM:      General Appearance:   Alert, cooperative, no distress   HEENT:   Normocephalic, atraumatic, anicteric. Neck:  Supple, symmetrical, trachea midline   Lungs:   Clear to auscultation bilaterally; no rales, rhonchi or wheezing; respirations unlabored    Heart[de-identified]   Regular rate and rhythm; no murmur, rub, or gallop. Abdomen:   Soft, non-tender, non-distended; normal bowel sounds; no masses, no organomegaly    Genitalia:   Deferred    Rectal:   Deferred    Extremities:  No cyanosis, clubbing or edema    Pulses:  2+ and symmetric    Skin:  No jaundice, rashes, or lesions    Lymph nodes:  No palpable cervical lymphadenopathy        Lab Results:     No results for input(s): "SODIUM", "K", "CL", "CO2", "BUN", "CREATININE", "GLUC", "CALCIUM", "MG", "PHOS" in the last 42714 hours. No results for input(s): "TP", "ALB", "PREALBUMIN", "TBILI", "BILIDIR", "IBILI", "AST", "ALT", "ALKPHOS", "GGT" in the last 85795 hours.     No results for input(s): "WBC", "HGB", "HCT", "PLT", "MCV", "MCH", "MCHC", "RDW", "MPV", "NRBC", "NEUTOPHILPCT", "IMMGRANS", "LYMPHOPCT", "MONOPCT", "EOSPCT", "BASOPCT", "[place]", "IMMGRANSABS", "LYMPHSABS", "Jenene Alt", "EOSABS", "BASOSABS" in the last 63777 hours. No results for input(s): "IRON", "TRANSFERRIN", "TRANSFERSAT", "FERRITIN", "RETIC" in the last 10814 hours. No results found for: "CRP"    No results found for: "[id number]", "TSH"    Radiology Results:   No results found.     Paco Pitts MD  PGY-5 Gastroenterology Fellow  7/26/2023 10:43 AM

## 2023-07-26 NOTE — PATIENT INSTRUCTIONS
Scheduled date of colonoscopy (as of today): 9/7/23  Physician performing colonoscopy: Dr. Domnick Krabbe  Location of colonoscopy: [facility]  Bowel prep reviewed with patient: Golytely  Instructions reviewed with patient by: Dagmar Flores  Clearances:  N/A

## 2023-09-07 ENCOUNTER — HOSPITAL ENCOUNTER (OUTPATIENT)
Dept: GASTROENTEROLOGY | Facility: HOSPITAL | Age: 43
Setting detail: OUTPATIENT SURGERY
Discharge: HOME | End: 2023-09-07
Attending: INTERNAL MEDICINE
Payer: COMMERCIAL

## 2023-09-07 ENCOUNTER — ANESTHESIA EVENT (OUTPATIENT)
Dept: GASTROENTEROLOGY | Facility: HOSPITAL | Age: 43
End: 2023-09-07

## 2023-09-07 ENCOUNTER — ANESTHESIA (OUTPATIENT)
Dept: GASTROENTEROLOGY | Facility: HOSPITAL | Age: 43
End: 2023-09-07

## 2023-09-07 VITALS
DIASTOLIC BLOOD PRESSURE: 63 MMHG | WEIGHT: 165 LBS | HEIGHT: 70 IN | TEMPERATURE: 97 F | OXYGEN SATURATION: 97 % | RESPIRATION RATE: 16 BRPM | HEART RATE: 91 BPM | BODY MASS INDEX: 23.62 KG/M2 | SYSTOLIC BLOOD PRESSURE: 101 MMHG

## 2023-09-07 DIAGNOSIS — Z12.11 COLON CANCER SCREENING: ICD-10-CM

## 2023-09-07 PROCEDURE — 88305 TISSUE EXAM BY PATHOLOGIST: CPT | Performed by: PATHOLOGY

## 2023-09-07 RX ORDER — ONDANSETRON 2 MG/ML
4 INJECTION INTRAMUSCULAR; INTRAVENOUS ONCE AS NEEDED
Status: CANCELLED | OUTPATIENT
Start: 2023-09-07

## 2023-09-07 RX ORDER — PROPOFOL 10 MG/ML
INJECTION, EMULSION INTRAVENOUS AS NEEDED
Status: DISCONTINUED | OUTPATIENT
Start: 2023-09-07 | End: 2023-09-07

## 2023-09-07 RX ORDER — SODIUM CHLORIDE 9 MG/ML
INJECTION, SOLUTION INTRAVENOUS CONTINUOUS PRN
Status: DISCONTINUED | OUTPATIENT
Start: 2023-09-07 | End: 2023-09-07

## 2023-09-07 RX ORDER — SODIUM CHLORIDE 9 MG/ML
20 INJECTION, SOLUTION INTRAVENOUS CONTINUOUS
Status: CANCELLED | OUTPATIENT
Start: 2023-09-07

## 2023-09-07 RX ORDER — SODIUM CHLORIDE 9 MG/ML
125 INJECTION, SOLUTION INTRAVENOUS CONTINUOUS
Status: CANCELLED | OUTPATIENT
Start: 2023-09-07

## 2023-09-07 RX ORDER — EPHEDRINE SULFATE 50 MG/ML
INJECTION INTRAVENOUS AS NEEDED
Status: DISCONTINUED | OUTPATIENT
Start: 2023-09-07 | End: 2023-09-07

## 2023-09-07 RX ADMIN — PROPOFOL 150 MCG/KG/MIN: 10 INJECTION, EMULSION INTRAVENOUS at 15:18

## 2023-09-07 RX ADMIN — EPHEDRINE SULFATE 10 MG: 50 INJECTION INTRAVENOUS at 15:33

## 2023-09-07 RX ADMIN — PROPOFOL 100 MG: 10 INJECTION, EMULSION INTRAVENOUS at 15:17

## 2023-09-07 RX ADMIN — SODIUM CHLORIDE: 9 INJECTION, SOLUTION INTRAVENOUS at 15:14

## 2023-09-07 NOTE — ANESTHESIA POSTPROCEDURE EVALUATION
Post-Op Assessment Note    CV Status:  Stable  Pain Score: 0    Pain management: adequate     Hydration Status:  Stable   PONV Controlled:  Controlled   Airway Patency:  Patent      Post Op Vitals Reviewed: Yes      Staff: Anesthesiologist         No notable events documented.     BP (!) 86/53 (09/07/23 1548)    Temp (!) 97 °F (36.1 °C) (09/07/23 1542)    Pulse 86 (09/07/23 1548)   Resp 16 (09/07/23 1548)    SpO2 98 % (09/07/23 1548)

## 2023-09-07 NOTE — ANESTHESIA PREPROCEDURE EVALUATION
Procedure:  COLONOSCOPY    Relevant Problems   CARDIO   (+) Other hyperlipidemia      NEURO/PSYCH   (+) Chronic left shoulder pain      Other   (+) Gilbert's syndrome        Physical Exam    Airway    Mallampati score: III  TM Distance: >3 FB  Neck ROM: full     Dental       Cardiovascular      Pulmonary      Other Findings        Anesthesia Plan  ASA Score- 2     Anesthesia Type- IV sedation with anesthesia with ASA Monitors. Additional Monitors:   Airway Plan:           Plan Factors-    Chart reviewed. EKG reviewed. Existing labs reviewed. Patient summary reviewed. Induction- intravenous. Postoperative Plan-     Informed Consent- Anesthetic plan and risks discussed with patient. I personally reviewed this patient with the CRNA. Discussed and agreed on the Anesthesia Plan with the CRNA. Fran Reynaga

## 2023-09-12 PROCEDURE — 88305 TISSUE EXAM BY PATHOLOGIST: CPT | Performed by: PATHOLOGY

## 2024-05-03 ENCOUNTER — OFFICE VISIT (OUTPATIENT)
Dept: FAMILY MEDICINE CLINIC | Facility: CLINIC | Age: 44
End: 2024-05-03
Payer: COMMERCIAL

## 2024-05-03 VITALS
HEART RATE: 65 BPM | BODY MASS INDEX: 24.05 KG/M2 | WEIGHT: 168 LBS | HEIGHT: 70 IN | DIASTOLIC BLOOD PRESSURE: 82 MMHG | OXYGEN SATURATION: 98 % | TEMPERATURE: 97.6 F | SYSTOLIC BLOOD PRESSURE: 124 MMHG | RESPIRATION RATE: 16 BRPM

## 2024-05-03 DIAGNOSIS — E78.49 OTHER HYPERLIPIDEMIA: ICD-10-CM

## 2024-05-03 DIAGNOSIS — Z00.01 ABNORMAL WELLNESS EXAM: ICD-10-CM

## 2024-05-03 DIAGNOSIS — R09.81 NASAL CONGESTION: Primary | ICD-10-CM

## 2024-05-03 DIAGNOSIS — E80.4 GILBERT'S SYNDROME: ICD-10-CM

## 2024-05-03 PROCEDURE — 99214 OFFICE O/P EST MOD 30 MIN: CPT | Performed by: FAMILY MEDICINE

## 2024-05-03 PROCEDURE — 99396 PREV VISIT EST AGE 40-64: CPT | Performed by: FAMILY MEDICINE

## 2024-05-03 RX ORDER — FLUTICASONE PROPIONATE 50 MCG
2 SPRAY, SUSPENSION (ML) NASAL DAILY
Qty: 16 G | Refills: 0 | Status: SHIPPED | OUTPATIENT
Start: 2024-05-03

## 2024-05-03 RX ORDER — GUAIFENESIN 600 MG/1
600 TABLET, EXTENDED RELEASE ORAL EVERY 12 HOURS SCHEDULED
Qty: 60 TABLET | Refills: 3 | Status: SHIPPED | OUTPATIENT
Start: 2024-05-03

## 2024-05-03 RX ORDER — LEVOCETIRIZINE DIHYDROCHLORIDE 5 MG/1
5 TABLET, FILM COATED ORAL EVERY EVENING
Qty: 30 TABLET | Refills: 3 | Status: SHIPPED | OUTPATIENT
Start: 2024-05-03

## 2024-05-03 NOTE — ASSESSMENT & PLAN NOTE
Present for the past year with increase in symptoms in the past 6 months.  Has tried Expectorant extended release and fluticasone nasal spray with minimal relief.  Continue fluticasone 50mcg 2 sprays daily.  Start Xyzal 5mg PO daily.  Start Mucinex 600mg BID.

## 2024-05-03 NOTE — ASSESSMENT & PLAN NOTE
Labwork from last year show elevated cholesterol and LDL, elevated from previous.  Discussion about low-fat diet and exercise as means of prevention.  Discussion to obtain new labwork to reassess need for further medical management.

## 2024-05-03 NOTE — PROGRESS NOTES
Name: Harley Nelson      : 1980      MRN: [id number]  Encounter Provider: Eva Patterson MD  Encounter Date: 5/3/2024   Encounter department:  [facility] [address] [facility]    Assessment & Plan     1. Nasal congestion  Assessment & Plan:  Present for the past year with increase in symptoms in the past 6 months.  Has tried Expectorant extended release and fluticasone nasal spray with minimal relief.  Continue fluticasone 50mcg 2 sprays daily.  Start Xyzal 5mg PO daily.  Start Mucinex 600mg BID.    Orders:  -     fluticasone (FLONASE) 50 mcg/act nasal spray; 2 sprays into each nostril daily  -     levocetirizine (XYZAL) 5 MG tablet; Take 1 tablet (5 mg total) by mouth every evening  -     guaiFENesin (MUCINEX) 600 mg 12 hr tablet; Take 1 tablet (600 mg total) by mouth every 12 (twelve) hours    2. Other hyperlipidemia  Assessment & Plan:  Labwork from last year show elevated cholesterol and LDL, elevated from previous.  Discussion about low-fat diet and exercise as means of prevention.  Discussion to obtain new labwork to reassess need for further medical management.       3. Gilbert's syndrome  Assessment & Plan:  Stable.  Last labwork shows elevated but stable bilirubin.       4. Abnormal wellness exam  Assessment & Plan:  Discussion about diet, exercise, immunizations, and safety precautions.    Orders:  -     CBC and differential; Future  -     Comprehensive metabolic panel; Future  -     TSH, 3rd generation with Free T4 reflex; Future  -     Lipid Panel with Direct LDL reflex; Future           Subjective     Harley Nelson is a 44yo male with PMH of Gilbert's syndrome who presents to the office for his annual well visit.   Notes persistent congestion since he had a cold about 1 year ago. Notes increase in the past 6 months where there feels like a lot of phlegm in his throat. Intermittent cough in which he coughs up clear phlegm. Has been taking extended release Expectorant, notes mild  relief.  Previously presented with a lot of nasal congestion, was given fluticasone nasal spray and ENT referral. States fluticasone mildly helped, what helped him most were nasal strips he wore at bedtime as needed.      Review of Systems   Constitutional:  Negative for chills and fever.   HENT:  Positive for congestion (nasal and throat). Negative for ear pain and sore throat.    Eyes:  Negative for pain and visual disturbance.   Respiratory:  Positive for cough. Negative for shortness of breath.    Cardiovascular:  Negative for chest pain and palpitations.   Gastrointestinal:  Negative for abdominal pain and vomiting.   Genitourinary:  Negative for dysuria and hematuria.   Musculoskeletal:  Negative for arthralgias and back pain.   Skin:  Negative for color change and rash.   Neurological:  Negative for seizures and syncope.   All other systems reviewed and are negative.      Past Medical History:   Diagnosis Date   • Gilbert's syndrome      Past Surgical History:   Procedure Laterality Date   • WISDOM TOOTH EXTRACTION       Family History   Problem Relation Age of Onset   • Colon cancer Father 50   • Diabetes Maternal Grandmother    • Arthritis Maternal Grandfather    • Diabetes Paternal Grandfather    • Dementia Paternal Grandfather      Social History     Socioeconomic History   • Marital status: /Civil Union     Spouse name: None   • Number of children: None   • Years of education: None   • Highest education level: None   Occupational History   • None   Tobacco Use   • Smoking status: Never   • Smokeless tobacco: Never   Vaping Use   • Vaping status: Never Used   Substance and Sexual Activity   • Alcohol use: Yes     Comment: occasionally   • Drug use: Never   • Sexual activity: None   Other Topics Concern   • None   Social History Narrative   • None     Social Determinants of Health     Financial Resource Strain: Not on file   Food Insecurity: Not on file   Transportation Needs: Not on file   Physical  "Activity: Not on file   Stress: Not on file   Social Connections: Not on file   Intimate Partner Violence: Not on file   Housing Stability: Not on file     Current Outpatient Medications on File Prior to Visit   Medication Sig   • [DISCONTINUED] fluticasone (FLONASE) 50 mcg/act nasal spray 2 sprays into each nostril daily   • polyethylene glycol (Golytely) 4000 mL solution Take 4,000 mL by mouth once for 1 dose Take 4000 mL by mouth once for 1 dose. Use as directed     No Known Allergies  Immunization History   Administered Date(s) Administered   • INFLUENZA 10/04/2014, 11/14/2015   • Td (adult), adsorbed 06/11/2018   • Tdap 04/22/2021       Objective     /82 (BP Location: Left arm, Patient Position: Sitting, Cuff Size: Large)   Pulse 65   Temp 97.6 °F (36.4 °C) (Tympanic)   Resp 16   Ht 5' 10\" (1.778 m)   Wt 76.2 kg (168 lb)   SpO2 98%   BMI 24.11 kg/m²     Physical Exam  Vitals and nursing note reviewed.   Constitutional:       Appearance: He is well-developed.   HENT:      Head: Normocephalic and atraumatic.   Eyes:      Pupils: Pupils are equal, round, and reactive to light.   Cardiovascular:      Rate and Rhythm: Normal rate and regular rhythm.      Heart sounds: Normal heart sounds.   Pulmonary:      Effort: Pulmonary effort is normal.      Breath sounds: Normal breath sounds.   Abdominal:      General: Bowel sounds are normal.      Palpations: Abdomen is soft.   Musculoskeletal:      Cervical back: Normal range of motion and neck supple.   Lymphadenopathy:      Cervical: No cervical adenopathy.   Skin:     General: Skin is warm.      Findings: No rash.   Neurological:      Mental Status: He is alert and oriented to person, place, and time.       Eva Patterson MD    "

## 2025-05-05 ENCOUNTER — RA CDI HCC (OUTPATIENT)
Dept: OTHER | Facility: HOSPITAL | Age: 45
End: 2025-05-05

## 2025-05-05 ENCOUNTER — OFFICE VISIT (OUTPATIENT)
Dept: FAMILY MEDICINE CLINIC | Facility: CLINIC | Age: 45
End: 2025-05-05
Payer: COMMERCIAL

## 2025-05-05 VITALS
HEART RATE: 71 BPM | DIASTOLIC BLOOD PRESSURE: 74 MMHG | SYSTOLIC BLOOD PRESSURE: 112 MMHG | WEIGHT: 166 LBS | HEIGHT: 70 IN | OXYGEN SATURATION: 99 % | RESPIRATION RATE: 16 BRPM | BODY MASS INDEX: 23.77 KG/M2 | TEMPERATURE: 96.7 F

## 2025-05-05 DIAGNOSIS — Z00.00 HEALTHCARE MAINTENANCE: Primary | ICD-10-CM

## 2025-05-05 DIAGNOSIS — E80.4 GILBERT'S SYNDROME: ICD-10-CM

## 2025-05-05 DIAGNOSIS — E78.49 OTHER HYPERLIPIDEMIA: ICD-10-CM

## 2025-05-05 PROCEDURE — 99396 PREV VISIT EST AGE 40-64: CPT | Performed by: FAMILY MEDICINE

## 2025-05-05 NOTE — PROGRESS NOTES
[facility] audit    E80.4  HCC coding opportunities          Chart Reviewed number of suggestions sent to Provider: 1     Patients Insurance        Commercial Insurance: [employer]

## 2025-05-05 NOTE — PROGRESS NOTES
"Name: Harley Nelson      : 1980      MRN: [id number]  Encounter Provider: Eva Patterson MD  Encounter Date: 2025   Encounter department: [facility]  :  Assessment & Plan  Healthcare maintenance         Other hyperlipidemia  - No updated labs available for review. Lipid panel ordered.   - Will contact pt with results.  Orders:  •  CBC and differential; Future  •  Comprehensive metabolic panel; Future  •  Lipid Panel with Direct LDL reflex; Future  •  TSH, 3rd generation with Free T4 reflex; Future    Gilbert's syndrome  - No updated labs available for review. CMP ordered.   - Will contact pt with results.              History of Present Illness   Harley Nelson is a 45 y/o male with a PMH of Gilbert's syndrome and HLD presenting for annual physical. He does not take any medications regularly. Blood pressure in the office today was 112/74. Pt reports making dietary changes (i.e. cutting out whole milk) to help lower cholesterol. No updated labs available for review. No additional complaints today.        Review of Systems   Constitutional:  Negative for chills, fatigue and fever.   HENT:  Negative for ear pain and sore throat.    Eyes:  Negative for pain.   Respiratory:  Negative for cough and shortness of breath.    Cardiovascular:  Negative for chest pain, palpitations and leg swelling.   Gastrointestinal:  Negative for constipation, diarrhea, nausea and vomiting.   Genitourinary:  Negative for dysuria and hematuria.   Skin:  Negative for rash.   Neurological:  Negative for dizziness, light-headedness and headaches.   All other systems reviewed and are negative.      Objective   /74 (BP Location: Left arm, Patient Position: Sitting, Cuff Size: Standard)   Pulse 71   Temp (!) 96.7 °F (35.9 °C) (Tympanic)   Resp 16   Ht 5' 10\" (1.778 m)   Wt 75.3 kg (166 lb)   SpO2 99%   BMI 23.82 kg/m²      Physical Exam  Vitals and nursing note reviewed.   Constitutional:       " General: He is not in acute distress.     Appearance: He is well-developed.   HENT:      Head: Normocephalic and atraumatic.      Right Ear: Tympanic membrane, ear canal and external ear normal.      Left Ear: Tympanic membrane, ear canal and external ear normal.      Nose: Nose normal. No congestion or rhinorrhea.      Mouth/Throat:      Mouth: Mucous membranes are moist.      Pharynx: No oropharyngeal exudate or posterior oropharyngeal erythema.   Eyes:      Extraocular Movements: Extraocular movements intact.      Conjunctiva/sclera: Conjunctivae normal.   Cardiovascular:      Rate and Rhythm: Normal rate and regular rhythm.      Heart sounds: No murmur heard.  Pulmonary:      Effort: Pulmonary effort is normal. No respiratory distress.      Breath sounds: Normal breath sounds.   Musculoskeletal:         General: No swelling.      Cervical back: Normal range of motion.   Skin:     General: Skin is warm and dry.   Neurological:      Mental Status: He is alert.   Psychiatric:         Mood and Affect: Mood normal.

## 2025-05-05 NOTE — ASSESSMENT & PLAN NOTE
- No updated labs available for review. Lipid panel ordered.   - Will contact pt with results.  Orders:  •  CBC and differential; Future  •  Comprehensive metabolic panel; Future  •  Lipid Panel with Direct LDL reflex; Future  •  TSH, 3rd generation with Free T4 reflex; Future

## 2025-06-04 PROBLEM — Z00.00 HEALTHCARE MAINTENANCE: Status: RESOLVED | Noted: 2025-05-05 | Resolved: 2025-06-04

## 2025-06-09 ENCOUNTER — APPOINTMENT (OUTPATIENT)
Dept: LAB | Facility: IMAGING CENTER | Age: 45
End: 2025-06-09
Payer: COMMERCIAL

## 2025-06-09 DIAGNOSIS — E78.49 OTHER HYPERLIPIDEMIA: ICD-10-CM

## 2025-06-09 LAB
ALBUMIN SERPL BCG-MCNC: 4.6 G/DL (ref 3.5–5)
ALP SERPL-CCNC: 59 U/L (ref 34–104)
ALT SERPL W P-5'-P-CCNC: 22 U/L (ref 7–52)
ANION GAP SERPL CALCULATED.3IONS-SCNC: 12 MMOL/L (ref 4–13)
AST SERPL W P-5'-P-CCNC: 22 U/L (ref 13–39)
BASOPHILS # BLD AUTO: 0.02 THOUSANDS/ÂΜL (ref 0–0.1)
BASOPHILS NFR BLD AUTO: 0 % (ref 0–1)
BILIRUB SERPL-MCNC: 1.37 MG/DL (ref 0.2–1)
BUN SERPL-MCNC: 15 MG/DL (ref 5–25)
CALCIUM SERPL-MCNC: 9.6 MG/DL (ref 8.4–10.2)
CHLORIDE SERPL-SCNC: 101 MMOL/L (ref 96–108)
CHOLEST SERPL-MCNC: 189 MG/DL (ref ?–200)
CO2 SERPL-SCNC: 27 MMOL/L (ref 21–32)
CREAT SERPL-MCNC: 0.78 MG/DL (ref 0.6–1.3)
EOSINOPHIL # BLD AUTO: 0.03 THOUSAND/ÂΜL (ref 0–0.61)
EOSINOPHIL NFR BLD AUTO: 1 % (ref 0–6)
ERYTHROCYTE [DISTWIDTH] IN BLOOD BY AUTOMATED COUNT: 12.4 % (ref 11.6–15.1)
GFR SERPL CREATININE-BSD FRML MDRD: 111 ML/MIN/1.73SQ M
GLUCOSE P FAST SERPL-MCNC: 93 MG/DL (ref 65–99)
HCT VFR BLD AUTO: 42.6 % (ref 36.5–49.3)
HDLC SERPL-MCNC: 42 MG/DL
HGB BLD-MCNC: 14.2 G/DL (ref 12–17)
IMM GRANULOCYTES # BLD AUTO: 0.01 THOUSAND/UL (ref 0–0.2)
IMM GRANULOCYTES NFR BLD AUTO: 0 % (ref 0–2)
LDLC SERPL CALC-MCNC: 128 MG/DL (ref 0–100)
LYMPHOCYTES # BLD AUTO: 1.85 THOUSANDS/ÂΜL (ref 0.6–4.47)
LYMPHOCYTES NFR BLD AUTO: 35 % (ref 14–44)
MCH RBC QN AUTO: 30 PG (ref 26.8–34.3)
MCHC RBC AUTO-ENTMCNC: 33.3 G/DL (ref 31.4–37.4)
MCV RBC AUTO: 90 FL (ref 82–98)
MONOCYTES # BLD AUTO: 0.44 THOUSAND/ÂΜL (ref 0.17–1.22)
MONOCYTES NFR BLD AUTO: 8 % (ref 4–12)
NEUTROPHILS # BLD AUTO: 2.97 THOUSANDS/ÂΜL (ref 1.85–7.62)
NEUTS SEG NFR BLD AUTO: 56 % (ref 43–75)
NRBC BLD AUTO-RTO: 0 /100 WBCS
PLATELET # BLD AUTO: 242 THOUSANDS/UL (ref 149–390)
PMV BLD AUTO: 12 FL (ref 8.9–12.7)
POTASSIUM SERPL-SCNC: 3.9 MMOL/L (ref 3.5–5.3)
PROT SERPL-MCNC: 7.4 G/DL (ref 6.4–8.4)
RBC # BLD AUTO: 4.74 MILLION/UL (ref 3.88–5.62)
SODIUM SERPL-SCNC: 140 MMOL/L (ref 135–147)
TRIGL SERPL-MCNC: 96 MG/DL (ref ?–150)
TSH SERPL DL<=0.05 MIU/L-ACNC: 1.99 UIU/ML (ref 0.45–4.5)
WBC # BLD AUTO: 5.32 THOUSAND/UL (ref 4.31–10.16)

## 2025-06-09 PROCEDURE — 36415 COLL VENOUS BLD VENIPUNCTURE: CPT

## 2025-06-09 PROCEDURE — 80061 LIPID PANEL: CPT

## 2025-06-09 PROCEDURE — 80053 COMPREHEN METABOLIC PANEL: CPT

## 2025-06-09 PROCEDURE — 84443 ASSAY THYROID STIM HORMONE: CPT

## 2025-06-09 PROCEDURE — 85025 COMPLETE CBC W/AUTO DIFF WBC: CPT

## 2025-06-11 ENCOUNTER — RESULTS FOLLOW-UP (OUTPATIENT)
Dept: FAMILY MEDICINE CLINIC | Facility: CLINIC | Age: 45
End: 2025-06-11